# Patient Record
Sex: FEMALE | Race: BLACK OR AFRICAN AMERICAN | Employment: OTHER
[De-identification: names, ages, dates, MRNs, and addresses within clinical notes are randomized per-mention and may not be internally consistent; named-entity substitution may affect disease eponyms.]

---

## 2017-01-01 ENCOUNTER — TELEPHONE (OUTPATIENT)
Dept: CASE MANAGEMENT | Age: 72
End: 2017-01-01

## 2017-02-01 ENCOUNTER — TELEPHONE (OUTPATIENT)
Dept: CASE MANAGEMENT | Age: 72
End: 2017-02-01

## 2017-02-13 PROBLEM — K52.9 GASTROENTERITIS, ACUTE: Status: ACTIVE | Noted: 2017-02-13

## 2017-02-24 PROBLEM — I10 ESSENTIAL HYPERTENSION: Status: ACTIVE | Noted: 2017-02-24

## 2017-02-24 PROBLEM — E66.09 NON MORBID OBESITY DUE TO EXCESS CALORIES: Status: ACTIVE | Noted: 2017-02-24

## 2017-05-10 PROBLEM — J45.909 ASTHMA: Status: ACTIVE | Noted: 2017-01-01

## 2017-05-12 PROBLEM — J12.9 VIRAL PNEUMONIA: Status: ACTIVE | Noted: 2017-01-01

## 2017-09-07 PROBLEM — R10.32 LEFT LOWER QUADRANT PAIN: Status: ACTIVE | Noted: 2017-01-01

## 2017-09-07 PROBLEM — R06.00 DYSPNEA: Status: ACTIVE | Noted: 2017-01-01

## 2017-09-07 PROBLEM — I50.22 CHRONIC SYSTOLIC CHF (CONGESTIVE HEART FAILURE) (HCC): Status: ACTIVE | Noted: 2017-01-01

## 2017-09-07 PROBLEM — K62.5 RECTAL BLEEDING: Status: ACTIVE | Noted: 2017-01-01

## 2017-09-07 PROBLEM — K57.92 DIVERTICULITIS: Status: ACTIVE | Noted: 2017-01-01

## 2017-09-14 PROBLEM — K57.92 DIVERTICULITIS: Status: RESOLVED | Noted: 2017-01-01 | Resolved: 2017-01-01

## 2017-09-14 PROBLEM — K52.9 GASTROENTERITIS, ACUTE: Status: RESOLVED | Noted: 2017-02-13 | Resolved: 2017-01-01

## 2017-09-14 PROBLEM — R10.32 LEFT LOWER QUADRANT PAIN: Status: RESOLVED | Noted: 2017-01-01 | Resolved: 2017-01-01

## 2017-09-14 PROBLEM — R06.00 DYSPNEA: Status: RESOLVED | Noted: 2017-01-01 | Resolved: 2017-01-01

## 2017-09-14 PROBLEM — J12.9 VIRAL PNEUMONIA: Status: RESOLVED | Noted: 2017-01-01 | Resolved: 2017-01-01

## 2017-09-23 PROBLEM — R06.02 SHORTNESS OF BREATH: Status: ACTIVE | Noted: 2017-01-01

## 2017-09-23 PROBLEM — J20.9 ACUTE BRONCHITIS: Status: ACTIVE | Noted: 2017-01-01

## 2017-09-24 PROBLEM — J40 BRONCHITIS: Status: ACTIVE | Noted: 2017-01-01

## 2017-10-04 PROBLEM — C50.112 MALIGNANT NEOPLASM OF CENTRAL PORTION OF LEFT FEMALE BREAST (HCC): Status: ACTIVE | Noted: 2017-01-01

## 2017-11-13 PROBLEM — Z79.4 TYPE 2 DIABETES MELLITUS WITH DIABETIC NEPHROPATHY, WITH LONG-TERM CURRENT USE OF INSULIN (HCC): Status: ACTIVE | Noted: 2017-01-01

## 2017-11-13 PROBLEM — E11.21 TYPE 2 DIABETES MELLITUS WITH DIABETIC NEPHROPATHY, WITH LONG-TERM CURRENT USE OF INSULIN (HCC): Status: ACTIVE | Noted: 2017-01-01

## 2017-11-29 PROBLEM — R06.02 SHORTNESS OF BREATH: Status: RESOLVED | Noted: 2017-01-01 | Resolved: 2017-01-01

## 2017-12-01 PROBLEM — E78.5 HYPERLIPIDEMIA: Status: ACTIVE | Noted: 2017-01-01

## 2017-12-01 PROBLEM — I63.9 ACUTE ISCHEMIC STROKE (HCC): Status: ACTIVE | Noted: 2017-01-01

## 2017-12-01 PROBLEM — I25.10 CAD (CORONARY ARTERY DISEASE): Status: ACTIVE | Noted: 2017-01-01

## 2018-01-01 ENCOUNTER — APPOINTMENT (OUTPATIENT)
Dept: GENERAL RADIOLOGY | Age: 73
DRG: 637 | End: 2018-01-01
Payer: COMMERCIAL

## 2018-01-01 ENCOUNTER — APPOINTMENT (OUTPATIENT)
Dept: CT IMAGING | Age: 73
DRG: 637 | End: 2018-01-01
Payer: COMMERCIAL

## 2018-01-01 ENCOUNTER — HOSPITAL ENCOUNTER (INPATIENT)
Age: 73
LOS: 3 days | DRG: 637 | End: 2018-03-13
Attending: EMERGENCY MEDICINE | Admitting: FAMILY MEDICINE
Payer: COMMERCIAL

## 2018-01-01 ENCOUNTER — APPOINTMENT (OUTPATIENT)
Dept: INTERVENTIONAL RADIOLOGY/VASCULAR | Age: 73
DRG: 637 | End: 2018-01-01
Payer: COMMERCIAL

## 2018-01-01 VITALS
OXYGEN SATURATION: 80 % | HEIGHT: 60 IN | HEART RATE: 138 BPM | TEMPERATURE: 100 F | WEIGHT: 143.4 LBS | RESPIRATION RATE: 26 BRPM | BODY MASS INDEX: 28.16 KG/M2 | SYSTOLIC BLOOD PRESSURE: 113 MMHG | DIASTOLIC BLOOD PRESSURE: 78 MMHG

## 2018-01-01 DIAGNOSIS — E13.10 DIABETIC KETOACIDOSIS WITHOUT COMA ASSOCIATED WITH OTHER SPECIFIED DIABETES MELLITUS (HCC): Primary | ICD-10-CM

## 2018-01-01 DIAGNOSIS — N39.0 URINARY TRACT INFECTION WITHOUT HEMATURIA, SITE UNSPECIFIED: ICD-10-CM

## 2018-01-01 DIAGNOSIS — G93.41 METABOLIC ENCEPHALOPATHY: ICD-10-CM

## 2018-01-01 DIAGNOSIS — N17.9 AKI (ACUTE KIDNEY INJURY) (HCC): ICD-10-CM

## 2018-01-01 LAB
ACETAMINOPHEN LEVEL: <15 MCG/ML (ref 10–30)
ALBUMIN SERPL-MCNC: 3.6 G/DL (ref 3.5–5.2)
ALP BLD-CCNC: 123 U/L (ref 35–104)
ALT SERPL-CCNC: 176 U/L (ref 0–32)
AMMONIA: 14 UMOL/L (ref 11–51)
AMORPHOUS: ABNORMAL
AMPHETAMINE SCREEN, URINE: NOT DETECTED
ANION GAP SERPL CALCULATED.3IONS-SCNC: 18 MMOL/L (ref 7–16)
ANION GAP SERPL CALCULATED.3IONS-SCNC: 19 MMOL/L (ref 7–16)
ANION GAP SERPL CALCULATED.3IONS-SCNC: 20 MMOL/L (ref 7–16)
ANION GAP SERPL CALCULATED.3IONS-SCNC: 20 MMOL/L (ref 7–16)
ANION GAP SERPL CALCULATED.3IONS-SCNC: 21 MMOL/L (ref 7–16)
ANION GAP SERPL CALCULATED.3IONS-SCNC: 21 MMOL/L (ref 7–16)
ANION GAP SERPL CALCULATED.3IONS-SCNC: 22 MMOL/L (ref 7–16)
ANION GAP SERPL CALCULATED.3IONS-SCNC: 25 MMOL/L (ref 7–16)
ANION GAP SERPL CALCULATED.3IONS-SCNC: 27 MMOL/L (ref 7–16)
ANION GAP SERPL CALCULATED.3IONS-SCNC: 30 MMOL/L (ref 7–16)
APTT: 27.2 SEC (ref 24.5–35.1)
APTT: 28.2 SEC (ref 24.5–35.1)
APTT: 41.7 SEC (ref 24.5–35.1)
APTT: 47.8 SEC (ref 24.5–35.1)
APTT: 64.7 SEC (ref 24.5–35.1)
APTT: 67.9 SEC (ref 24.5–35.1)
AST SERPL-CCNC: 529 U/L (ref 0–31)
B.E.: -4.6 MMOL/L (ref -3–3)
BACTERIA: ABNORMAL /HPF
BARBITURATE SCREEN URINE: NOT DETECTED
BASOPHILS ABSOLUTE: 0.02 E9/L (ref 0–0.2)
BASOPHILS ABSOLUTE: 0.02 E9/L (ref 0–0.2)
BASOPHILS ABSOLUTE: 0.03 E9/L (ref 0–0.2)
BASOPHILS RELATIVE PERCENT: 0.1 % (ref 0–2)
BASOPHILS RELATIVE PERCENT: 0.1 % (ref 0–2)
BASOPHILS RELATIVE PERCENT: 0.2 % (ref 0–2)
BENZODIAZEPINE SCREEN, URINE: NOT DETECTED
BETA-HYDROXYBUTYRATE: 0.12 MMOL/L (ref 0.02–0.27)
BETA-HYDROXYBUTYRATE: 1.75 MMOL/L (ref 0.02–0.27)
BILIRUB SERPL-MCNC: 1 MG/DL (ref 0–1.2)
BILIRUB SERPL-MCNC: 1.7 MG/DL (ref 0–1.2)
BILIRUBIN DIRECT: 0.6 MG/DL (ref 0–0.3)
BILIRUBIN URINE: ABNORMAL
BILIRUBIN, INDIRECT: 0.4 MG/DL (ref 0–1)
BLOOD, URINE: ABNORMAL
BUN BLDV-MCNC: 28 MG/DL (ref 8–23)
BUN BLDV-MCNC: 30 MG/DL (ref 8–23)
BUN BLDV-MCNC: 33 MG/DL (ref 8–23)
BUN BLDV-MCNC: 33 MG/DL (ref 8–23)
BUN BLDV-MCNC: 34 MG/DL (ref 8–23)
BUN BLDV-MCNC: 35 MG/DL (ref 8–23)
BUN BLDV-MCNC: 35 MG/DL (ref 8–23)
BUN BLDV-MCNC: 36 MG/DL (ref 8–23)
BUN BLDV-MCNC: 37 MG/DL (ref 8–23)
BUN BLDV-MCNC: 38 MG/DL (ref 8–23)
BUN BLDV-MCNC: 39 MG/DL (ref 8–23)
BUN BLDV-MCNC: 43 MG/DL (ref 8–23)
CALCIUM SERPL-MCNC: 7.7 MG/DL (ref 8.6–10.2)
CALCIUM SERPL-MCNC: 7.7 MG/DL (ref 8.6–10.2)
CALCIUM SERPL-MCNC: 7.8 MG/DL (ref 8.6–10.2)
CALCIUM SERPL-MCNC: 7.9 MG/DL (ref 8.6–10.2)
CALCIUM SERPL-MCNC: 7.9 MG/DL (ref 8.6–10.2)
CALCIUM SERPL-MCNC: 8 MG/DL (ref 8.6–10.2)
CALCIUM SERPL-MCNC: 8 MG/DL (ref 8.6–10.2)
CALCIUM SERPL-MCNC: 8.1 MG/DL (ref 8.6–10.2)
CALCIUM SERPL-MCNC: 8.1 MG/DL (ref 8.6–10.2)
CALCIUM SERPL-MCNC: 8.2 MG/DL (ref 8.6–10.2)
CALCIUM SERPL-MCNC: 8.2 MG/DL (ref 8.6–10.2)
CALCIUM SERPL-MCNC: 9.5 MG/DL (ref 8.6–10.2)
CANNABINOID SCREEN URINE: NOT DETECTED
CHLORIDE BLD-SCNC: 100 MMOL/L (ref 98–107)
CHLORIDE BLD-SCNC: 100 MMOL/L (ref 98–107)
CHLORIDE BLD-SCNC: 101 MMOL/L (ref 98–107)
CHLORIDE BLD-SCNC: 84 MMOL/L (ref 98–107)
CHLORIDE BLD-SCNC: 93 MMOL/L (ref 98–107)
CHLORIDE BLD-SCNC: 94 MMOL/L (ref 98–107)
CHLORIDE BLD-SCNC: 94 MMOL/L (ref 98–107)
CHLORIDE BLD-SCNC: 95 MMOL/L (ref 98–107)
CHLORIDE BLD-SCNC: 97 MMOL/L (ref 98–107)
CHLORIDE BLD-SCNC: 98 MMOL/L (ref 98–107)
CHLORIDE BLD-SCNC: 99 MMOL/L (ref 98–107)
CHLORIDE URINE RANDOM: 24 MMOL/L
CHP ED QC CHECK: NORMAL
CHP ED QC CHECK: NORMAL
CK MB: 34 NG/ML (ref 0–4.3)
CK MB: 35.5 NG/ML (ref 0–4.3)
CLARITY: ABNORMAL
CO2: 11 MMOL/L (ref 22–29)
CO2: 13 MMOL/L (ref 22–29)
CO2: 14 MMOL/L (ref 22–29)
CO2: 14 MMOL/L (ref 22–29)
CO2: 16 MMOL/L (ref 22–29)
CO2: 17 MMOL/L (ref 22–29)
CO2: 17 MMOL/L (ref 22–29)
CO2: 18 MMOL/L (ref 22–29)
CO2: 20 MMOL/L (ref 22–29)
COCAINE METABOLITE SCREEN URINE: NOT DETECTED
COHB: 0.2 % (ref 0–1.5)
COHB: 0.3 % (ref 0–1.5)
COLOR: YELLOW
CREAT SERPL-MCNC: 1.5 MG/DL (ref 0.5–1)
CREAT SERPL-MCNC: 1.5 MG/DL (ref 0.5–1)
CREAT SERPL-MCNC: 1.6 MG/DL (ref 0.5–1)
CREAT SERPL-MCNC: 1.6 MG/DL (ref 0.5–1)
CREAT SERPL-MCNC: 1.7 MG/DL (ref 0.5–1)
CREAT SERPL-MCNC: 1.8 MG/DL (ref 0.5–1)
CREAT SERPL-MCNC: 2 MG/DL (ref 0.5–1)
CREAT SERPL-MCNC: 2 MG/DL (ref 0.5–1)
CREAT SERPL-MCNC: 2.2 MG/DL (ref 0.5–1)
CREAT SERPL-MCNC: 2.2 MG/DL (ref 0.5–1)
CREAT SERPL-MCNC: 2.5 MG/DL (ref 0.5–1)
CREATININE URINE: 344 MG/DL (ref 29–226)
CRITICAL: ABNORMAL
CRITICAL: ABNORMAL
DATE ANALYZED: ABNORMAL
DATE ANALYZED: ABNORMAL
DATE OF COLLECTION: ABNORMAL
DATE OF COLLECTION: ABNORMAL
EKG ATRIAL RATE: 109 BPM
EKG Q-T INTERVAL: 440 MS
EKG QRS DURATION: 160 MS
EKG QTC CALCULATION (BAZETT): 592 MS
EKG R AXIS: -137 DEGREES
EKG T AXIS: -65 DEGREES
EKG VENTRICULAR RATE: 109 BPM
EOSINOPHILS ABSOLUTE: 0 E9/L (ref 0.05–0.5)
EOSINOPHILS ABSOLUTE: 0 E9/L (ref 0.05–0.5)
EOSINOPHILS ABSOLUTE: 0.01 E9/L (ref 0.05–0.5)
EOSINOPHILS RELATIVE PERCENT: 0 % (ref 0–6)
EOSINOPHILS RELATIVE PERCENT: 0 % (ref 0–6)
EOSINOPHILS RELATIVE PERCENT: 0.1 % (ref 0–6)
ETHANOL: <10 MG/DL (ref 0–0.08)
FILM ARRAY ADENOVIRUS: NORMAL
FILM ARRAY BORDETELLA PERTUSSIS: NORMAL
FILM ARRAY CHLAMYDOPHILIA PNEUMONIAE: NORMAL
FILM ARRAY CORONAVIRUS 229E: NORMAL
FILM ARRAY CORONAVIRUS HKU1: NORMAL
FILM ARRAY CORONAVIRUS NL63: NORMAL
FILM ARRAY CORONAVIRUS OC43: NORMAL
FILM ARRAY INFLUENZA A VIRUS 09H1: NORMAL
FILM ARRAY INFLUENZA A VIRUS H1: NORMAL
FILM ARRAY INFLUENZA A VIRUS H3: NORMAL
FILM ARRAY INFLUENZA A VIRUS: NORMAL
FILM ARRAY INFLUENZA B: NORMAL
FILM ARRAY METAPNEUMOVIRUS: NORMAL
FILM ARRAY MYCOPLASMA PNEUMONIAE: NORMAL
FILM ARRAY PARAINFLUENZA VIRUS 1: NORMAL
FILM ARRAY PARAINFLUENZA VIRUS 2: NORMAL
FILM ARRAY PARAINFLUENZA VIRUS 3: NORMAL
FILM ARRAY PARAINFLUENZA VIRUS 4: NORMAL
FILM ARRAY RESPIRATORY SYNCITIAL VIRUS: NORMAL
FILM ARRAY RHINOVIRUS/ENTEROVIRUS: NORMAL
GFR AFRICAN AMERICAN: 18
GFR AFRICAN AMERICAN: 23
GFR AFRICAN AMERICAN: 26
GFR AFRICAN AMERICAN: 26
GFR AFRICAN AMERICAN: 29
GFR AFRICAN AMERICAN: 29
GFR AFRICAN AMERICAN: 33
GFR AFRICAN AMERICAN: 35
GFR AFRICAN AMERICAN: 38
GFR AFRICAN AMERICAN: 38
GFR AFRICAN AMERICAN: 41
GFR AFRICAN AMERICAN: 41
GFR NON-AFRICAN AMERICAN: 15 ML/MIN/1.73
GFR NON-AFRICAN AMERICAN: 23 ML/MIN/1.73
GFR NON-AFRICAN AMERICAN: 26 ML/MIN/1.73
GFR NON-AFRICAN AMERICAN: 26 ML/MIN/1.73
GFR NON-AFRICAN AMERICAN: 29 ML/MIN/1.73
GFR NON-AFRICAN AMERICAN: 29 ML/MIN/1.73
GFR NON-AFRICAN AMERICAN: 33 ML/MIN/1.73
GFR NON-AFRICAN AMERICAN: 35 ML/MIN/1.73
GFR NON-AFRICAN AMERICAN: 38 ML/MIN/1.73
GFR NON-AFRICAN AMERICAN: 38 ML/MIN/1.73
GFR NON-AFRICAN AMERICAN: 41 ML/MIN/1.73
GFR NON-AFRICAN AMERICAN: 41 ML/MIN/1.73
GLUCOSE BLD-MCNC: 131 MG/DL (ref 74–109)
GLUCOSE BLD-MCNC: 134 MG/DL (ref 74–109)
GLUCOSE BLD-MCNC: 136 MG/DL (ref 74–109)
GLUCOSE BLD-MCNC: 164 MG/DL (ref 74–109)
GLUCOSE BLD-MCNC: 171 MG/DL (ref 74–109)
GLUCOSE BLD-MCNC: 174 MG/DL (ref 74–109)
GLUCOSE BLD-MCNC: 176 MG/DL (ref 74–109)
GLUCOSE BLD-MCNC: 183 MG/DL (ref 74–109)
GLUCOSE BLD-MCNC: 195 MG/DL (ref 74–109)
GLUCOSE BLD-MCNC: 200 MG/DL (ref 74–109)
GLUCOSE BLD-MCNC: 201 MG/DL (ref 74–109)
GLUCOSE BLD-MCNC: 203 MG/DL (ref 74–109)
GLUCOSE BLD-MCNC: 217 MG/DL (ref 74–109)
GLUCOSE BLD-MCNC: 253 MG/DL (ref 74–109)
GLUCOSE BLD-MCNC: 299 MG/DL
GLUCOSE BLD-MCNC: 398 MG/DL
GLUCOSE BLD-MCNC: 449 MG/DL (ref 74–109)
GLUCOSE BLD-MCNC: 472 MG/DL (ref 74–109)
GLUCOSE URINE: 250 MG/DL
HBA1C MFR BLD: 8.8 % (ref 4.8–5.9)
HCO3: 18.7 MMOL/L (ref 22–26)
HCT VFR BLD CALC: 30.4 % (ref 34–48)
HCT VFR BLD CALC: 31.9 % (ref 34–48)
HCT VFR BLD CALC: 32.7 % (ref 34–48)
HCT VFR BLD CALC: 38.1 % (ref 34–48)
HEMOGLOBIN: 10.5 G/DL (ref 11.5–15.5)
HEMOGLOBIN: 10.5 G/DL (ref 11.5–15.5)
HEMOGLOBIN: 12.5 G/DL (ref 11.5–15.5)
HEMOGLOBIN: 9.8 G/DL (ref 11.5–15.5)
HHB: 0.6 % (ref 0–5)
HHB: 1.8 % (ref 0–5)
IMMATURE GRANULOCYTES #: 0.09 E9/L
IMMATURE GRANULOCYTES #: 0.11 E9/L
IMMATURE GRANULOCYTES #: 0.18 E9/L
IMMATURE GRANULOCYTES %: 0.6 % (ref 0–5)
IMMATURE GRANULOCYTES %: 0.6 % (ref 0–5)
IMMATURE GRANULOCYTES %: 0.9 % (ref 0–5)
INR BLD: 1.7
INR BLD: 1.9
KETONES, URINE: ABNORMAL MG/DL
LAB: 9558
LAB: ABNORMAL
LACTIC ACID: 3.2 MMOL/L (ref 0.5–2.2)
LACTIC ACID: 3.5 MMOL/L (ref 0.5–2.2)
LACTIC ACID: 4 MMOL/L (ref 0.5–2.2)
LACTIC ACID: 4.4 MMOL/L (ref 0.5–2.2)
LACTIC ACID: 4.6 MMOL/L (ref 0.5–2.2)
LACTIC ACID: 5.9 MMOL/L (ref 0.5–2.2)
LACTIC ACID: 5.9 MMOL/L (ref 0.5–2.2)
LACTIC ACID: 6.9 MMOL/L (ref 0.5–2.2)
LACTIC ACID: 7.5 MMOL/L (ref 0.5–2.2)
LACTIC ACID: 7.9 MMOL/L (ref 0.5–2.2)
LACTIC ACID: 9.8 MMOL/L (ref 0.5–2.2)
LEUKOCYTE ESTERASE, URINE: ABNORMAL
LV EF: 13 %
LVEF MODALITY: NORMAL
LYMPHOCYTES ABSOLUTE: 1.41 E9/L (ref 1.5–4)
LYMPHOCYTES ABSOLUTE: 1.56 E9/L (ref 1.5–4)
LYMPHOCYTES ABSOLUTE: 1.88 E9/L (ref 1.5–4)
LYMPHOCYTES RELATIVE PERCENT: 10 % (ref 20–42)
LYMPHOCYTES RELATIVE PERCENT: 8.2 % (ref 20–42)
LYMPHOCYTES RELATIVE PERCENT: 9.1 % (ref 20–42)
Lab: 1208
Lab: 40
MAGNESIUM: 1.3 MG/DL (ref 1.6–2.6)
MAGNESIUM: 1.6 MG/DL (ref 1.6–2.6)
MAGNESIUM: 1.8 MG/DL (ref 1.6–2.6)
MAGNESIUM: 2.1 MG/DL (ref 1.6–2.6)
MAGNESIUM: 2.1 MG/DL (ref 1.6–2.6)
MAGNESIUM: 2.2 MG/DL (ref 1.6–2.6)
MAGNESIUM: 2.3 MG/DL (ref 1.6–2.6)
MAGNESIUM: 2.3 MG/DL (ref 1.6–2.6)
MAGNESIUM: 2.4 MG/DL (ref 1.6–2.6)
MAGNESIUM: 2.5 MG/DL (ref 1.6–2.6)
MAGNESIUM: 2.5 MG/DL (ref 1.6–2.6)
MAGNESIUM: 2.7 MG/DL (ref 1.6–2.6)
MAGNESIUM: 3 MG/DL (ref 1.6–2.6)
MCH RBC QN AUTO: 30.2 PG (ref 26–35)
MCH RBC QN AUTO: 30.3 PG (ref 26–35)
MCH RBC QN AUTO: 30.3 PG (ref 26–35)
MCH RBC QN AUTO: 30.7 PG (ref 26–35)
MCHC RBC AUTO-ENTMCNC: 32.1 % (ref 32–34.5)
MCHC RBC AUTO-ENTMCNC: 32.2 % (ref 32–34.5)
MCHC RBC AUTO-ENTMCNC: 32.8 % (ref 32–34.5)
MCHC RBC AUTO-ENTMCNC: 32.9 % (ref 32–34.5)
MCV RBC AUTO: 92 FL (ref 80–99.9)
MCV RBC AUTO: 93.3 FL (ref 80–99.9)
MCV RBC AUTO: 94.1 FL (ref 80–99.9)
MCV RBC AUTO: 94.2 FL (ref 80–99.9)
METER GLUCOSE: 107 MG/DL (ref 70–110)
METER GLUCOSE: 114 MG/DL (ref 70–110)
METER GLUCOSE: 116 MG/DL (ref 70–110)
METER GLUCOSE: 123 MG/DL (ref 70–110)
METER GLUCOSE: 126 MG/DL (ref 70–110)
METER GLUCOSE: 126 MG/DL (ref 70–110)
METER GLUCOSE: 128 MG/DL (ref 70–110)
METER GLUCOSE: 129 MG/DL (ref 70–110)
METER GLUCOSE: 130 MG/DL (ref 70–110)
METER GLUCOSE: 132 MG/DL (ref 70–110)
METER GLUCOSE: 133 MG/DL (ref 70–110)
METER GLUCOSE: 138 MG/DL (ref 70–110)
METER GLUCOSE: 140 MG/DL (ref 70–110)
METER GLUCOSE: 141 MG/DL (ref 70–110)
METER GLUCOSE: 143 MG/DL (ref 70–110)
METER GLUCOSE: 145 MG/DL (ref 70–110)
METER GLUCOSE: 146 MG/DL (ref 70–110)
METER GLUCOSE: 147 MG/DL (ref 70–110)
METER GLUCOSE: 151 MG/DL (ref 70–110)
METER GLUCOSE: 154 MG/DL (ref 70–110)
METER GLUCOSE: 156 MG/DL (ref 70–110)
METER GLUCOSE: 156 MG/DL (ref 70–110)
METER GLUCOSE: 157 MG/DL (ref 70–110)
METER GLUCOSE: 158 MG/DL (ref 70–110)
METER GLUCOSE: 158 MG/DL (ref 70–110)
METER GLUCOSE: 166 MG/DL (ref 70–110)
METER GLUCOSE: 167 MG/DL (ref 70–110)
METER GLUCOSE: 169 MG/DL (ref 70–110)
METER GLUCOSE: 171 MG/DL (ref 70–110)
METER GLUCOSE: 174 MG/DL (ref 70–110)
METER GLUCOSE: 176 MG/DL (ref 70–110)
METER GLUCOSE: 179 MG/DL (ref 70–110)
METER GLUCOSE: 183 MG/DL (ref 70–110)
METER GLUCOSE: 185 MG/DL (ref 70–110)
METER GLUCOSE: 190 MG/DL (ref 70–110)
METER GLUCOSE: 190 MG/DL (ref 70–110)
METER GLUCOSE: 191 MG/DL (ref 70–110)
METER GLUCOSE: 193 MG/DL (ref 70–110)
METER GLUCOSE: 193 MG/DL (ref 70–110)
METER GLUCOSE: 195 MG/DL (ref 70–110)
METER GLUCOSE: 195 MG/DL (ref 70–110)
METER GLUCOSE: 202 MG/DL (ref 70–110)
METER GLUCOSE: 203 MG/DL (ref 70–110)
METER GLUCOSE: 207 MG/DL (ref 70–110)
METER GLUCOSE: 214 MG/DL (ref 70–110)
METER GLUCOSE: 242 MG/DL (ref 70–110)
METER GLUCOSE: 299 MG/DL (ref 70–110)
METER GLUCOSE: 322 MG/DL (ref 70–110)
METER GLUCOSE: 398 MG/DL (ref 70–110)
METER GLUCOSE: 426 MG/DL (ref 70–110)
METER GLUCOSE: 88 MG/DL (ref 70–110)
METER GLUCOSE: >500 MG/DL (ref 70–110)
METHADONE SCREEN, URINE: NOT DETECTED
METHB: 0.4 % (ref 0–1.5)
METHB: 1 % (ref 0–1.5)
MODE: ABNORMAL
MODE: ABNORMAL
MONOCYTES ABSOLUTE: 0.83 E9/L (ref 0.1–0.95)
MONOCYTES ABSOLUTE: 1.29 E9/L (ref 0.1–0.95)
MONOCYTES ABSOLUTE: 1.39 E9/L (ref 0.1–0.95)
MONOCYTES RELATIVE PERCENT: 5.3 % (ref 2–12)
MONOCYTES RELATIVE PERCENT: 6.9 % (ref 2–12)
MONOCYTES RELATIVE PERCENT: 7.3 % (ref 2–12)
MRSA CULTURE ONLY: NORMAL
NEUTROPHILS ABSOLUTE: 13.2 E9/L (ref 1.8–7.3)
NEUTROPHILS ABSOLUTE: 15.39 E9/L (ref 1.8–7.3)
NEUTROPHILS ABSOLUTE: 15.94 E9/L (ref 1.8–7.3)
NEUTROPHILS RELATIVE PERCENT: 82.2 % (ref 43–80)
NEUTROPHILS RELATIVE PERCENT: 83.5 % (ref 43–80)
NEUTROPHILS RELATIVE PERCENT: 84.9 % (ref 43–80)
NITRITE, URINE: NEGATIVE
O2 CONTENT: 16 ML/DL
O2 CONTENT: 18.8 ML/DL
O2 SATURATION: 98.2 % (ref 92–98.5)
O2 SATURATION: 99.4 % (ref 92–98.5)
O2HB: 97.5 % (ref 94–97)
O2HB: 98.2 % (ref 94–97)
OPERATOR ID: 1353
OPERATOR ID: ABNORMAL
OPIATE SCREEN URINE: NOT DETECTED
PATIENT TEMP: 37 C
PATIENT TEMP: 37 C
PCO2: 29.5 MMHG (ref 35–45)
PCO2: <15 MMHG (ref 35–45)
PDW BLD-RTO: 15.2 FL (ref 11.5–15)
PDW BLD-RTO: 15.3 FL (ref 11.5–15)
PDW BLD-RTO: 15.3 FL (ref 11.5–15)
PDW BLD-RTO: 15.4 FL (ref 11.5–15)
PH BLOOD GAS: 7.35 (ref 7.35–7.45)
PH BLOOD GAS: 7.42 (ref 7.35–7.45)
PH UA: 5 (ref 5–9)
PH VENOUS: 7.27 (ref 7.3–7.42)
PHENCYCLIDINE SCREEN URINE: NOT DETECTED
PHOSPHORUS: 2.7 MG/DL (ref 2.5–4.5)
PHOSPHORUS: 2.9 MG/DL (ref 2.5–4.5)
PHOSPHORUS: 3 MG/DL (ref 2.5–4.5)
PHOSPHORUS: 3 MG/DL (ref 2.5–4.5)
PHOSPHORUS: 3.2 MG/DL (ref 2.5–4.5)
PHOSPHORUS: 3.4 MG/DL (ref 2.5–4.5)
PHOSPHORUS: 3.4 MG/DL (ref 2.5–4.5)
PHOSPHORUS: 3.5 MG/DL (ref 2.5–4.5)
PHOSPHORUS: 3.6 MG/DL (ref 2.5–4.5)
PHOSPHORUS: 3.7 MG/DL (ref 2.5–4.5)
PHOSPHORUS: 3.8 MG/DL (ref 2.5–4.5)
PHOSPHORUS: 3.8 MG/DL (ref 2.5–4.5)
PHOSPHORUS: 3.9 MG/DL (ref 2.5–4.5)
PHOSPHORUS: 4.1 MG/DL (ref 2.5–4.5)
PHOSPHORUS: 4.4 MG/DL (ref 2.5–4.5)
PLATELET # BLD: 124 E9/L (ref 130–450)
PLATELET # BLD: 125 E9/L (ref 130–450)
PLATELET # BLD: 195 E9/L (ref 130–450)
PLATELET # BLD: 69 E9/L (ref 130–450)
PLATELET CONFIRMATION: NORMAL
PMV BLD AUTO: 11.6 FL (ref 7–12)
PMV BLD AUTO: 11.8 FL (ref 7–12)
PMV BLD AUTO: 12.1 FL (ref 7–12)
PMV BLD AUTO: 12.4 FL (ref 7–12)
PO2: 125.9 MMHG (ref 60–100)
PO2: 409.8 MMHG (ref 60–100)
POC CHLORIDE: 98 MMOL/L (ref 100–108)
POC CREATININE: 3 MG/DL (ref 0.5–1)
POC POTASSIUM: 5.2 MMOL/L (ref 3.5–5)
POC SODIUM: 129 MMOL/L (ref 132–146)
POTASSIUM SERPL-SCNC: 3.8 MMOL/L (ref 3.5–5)
POTASSIUM SERPL-SCNC: 4 MMOL/L (ref 3.5–5)
POTASSIUM SERPL-SCNC: 4.1 MMOL/L (ref 3.5–5)
POTASSIUM SERPL-SCNC: 4.4 MMOL/L (ref 3.5–5)
POTASSIUM SERPL-SCNC: 4.5 MMOL/L (ref 3.5–5)
POTASSIUM SERPL-SCNC: 4.54 MMOL/L (ref 3.3–5.1)
POTASSIUM SERPL-SCNC: 4.6 MMOL/L (ref 3.5–5)
POTASSIUM SERPL-SCNC: 4.7 MMOL/L (ref 3.5–5)
POTASSIUM SERPL-SCNC: 4.9 MMOL/L (ref 3.5–5)
POTASSIUM SERPL-SCNC: 4.9 MMOL/L (ref 3.5–5)
POTASSIUM SERPL-SCNC: 5 MMOL/L (ref 3.5–5)
POTASSIUM SERPL-SCNC: 5.3 MMOL/L (ref 3.5–5)
POTASSIUM SERPL-SCNC: 5.4 MMOL/L (ref 3.5–5)
POTASSIUM SERPL-SCNC: 5.9 MMOL/L (ref 3.5–5)
POTASSIUM, UR: 50.5 MMOL/L
PROPOXYPHENE SCREEN: NOT DETECTED
PROTEIN UA: ABNORMAL MG/DL
PROTHROMBIN TIME: 19 SEC (ref 9.3–12.4)
PROTHROMBIN TIME: 21 SEC (ref 9.3–12.4)
RBC # BLD: 3.23 E12/L (ref 3.5–5.5)
RBC # BLD: 3.42 E12/L (ref 3.5–5.5)
RBC # BLD: 3.47 E12/L (ref 3.5–5.5)
RBC # BLD: 4.14 E12/L (ref 3.5–5.5)
RBC UA: ABNORMAL /HPF (ref 0–2)
SALICYLATE, SERUM: <0.3 MG/DL (ref 0–30)
SODIUM BLD-SCNC: 130 MMOL/L (ref 132–146)
SODIUM BLD-SCNC: 131 MMOL/L (ref 132–146)
SODIUM BLD-SCNC: 131 MMOL/L (ref 132–146)
SODIUM BLD-SCNC: 132 MMOL/L (ref 132–146)
SODIUM BLD-SCNC: 133 MMOL/L (ref 132–146)
SODIUM BLD-SCNC: 135 MMOL/L (ref 132–146)
SODIUM BLD-SCNC: 137 MMOL/L (ref 132–146)
SODIUM BLD-SCNC: 137 MMOL/L (ref 132–146)
SODIUM BLD-SCNC: 138 MMOL/L (ref 132–146)
SODIUM BLD-SCNC: 139 MMOL/L (ref 132–146)
SODIUM URINE: 29 MMOL/L
SOURCE, BLOOD GAS: ABNORMAL
SOURCE, BLOOD GAS: ABNORMAL
SPECIFIC GRAVITY UA: >=1.03 (ref 1–1.03)
THB: 11.5 G/DL (ref 11.5–16.5)
THB: 12.8 G/DL (ref 11.5–16.5)
TIME ANALYZED: 1211
TIME ANALYZED: 41
TOTAL CK: 3183 U/L (ref 20–180)
TOTAL CK: 3259 U/L (ref 20–180)
TOTAL CK: 3785 U/L (ref 20–180)
TOTAL PROTEIN: 7.8 G/DL (ref 6.4–8.3)
TRICYCLIC ANTIDEPRESSANTS SCREEN SERUM: NEGATIVE NG/ML
TROPONIN: 6.99 NG/ML (ref 0–0.03)
TROPONIN: 7.42 NG/ML (ref 0–0.03)
TROPONIN: 7.48 NG/ML (ref 0–0.03)
TROPONIN: 7.49 NG/ML (ref 0–0.03)
UREA NITROGEN, UR: 318 MG/DL (ref 800–1666)
URINE CULTURE, ROUTINE: NORMAL
URINE CULTURE, ROUTINE: NORMAL
UROBILINOGEN, URINE: 1 E.U./DL
WBC # BLD: 15.6 E9/L (ref 4.5–11.5)
WBC # BLD: 16.9 E9/L (ref 4.5–11.5)
WBC # BLD: 18.7 E9/L (ref 4.5–11.5)
WBC # BLD: 19.1 E9/L (ref 4.5–11.5)
WBC UA: ABNORMAL /HPF (ref 0–5)

## 2018-01-01 PROCEDURE — 6360000002 HC RX W HCPCS: Performed by: HOSPITALIST

## 2018-01-01 PROCEDURE — 87798 DETECT AGENT NOS DNA AMP: CPT

## 2018-01-01 PROCEDURE — 85027 COMPLETE CBC AUTOMATED: CPT

## 2018-01-01 PROCEDURE — 80048 BASIC METABOLIC PNL TOTAL CA: CPT

## 2018-01-01 PROCEDURE — 99232 SBSQ HOSP IP/OBS MODERATE 35: CPT | Performed by: STUDENT IN AN ORGANIZED HEALTH CARE EDUCATION/TRAINING PROGRAM

## 2018-01-01 PROCEDURE — 2580000003 HC RX 258: Performed by: INTERNAL MEDICINE

## 2018-01-01 PROCEDURE — 99223 1ST HOSP IP/OBS HIGH 75: CPT | Performed by: INTERNAL MEDICINE

## 2018-01-01 PROCEDURE — 2580000003 HC RX 258: Performed by: HOSPITALIST

## 2018-01-01 PROCEDURE — 93307 TTE W/O DOPPLER COMPLETE: CPT

## 2018-01-01 PROCEDURE — 80053 COMPREHEN METABOLIC PANEL: CPT

## 2018-01-01 PROCEDURE — 2500000003 HC RX 250 WO HCPCS: Performed by: FAMILY MEDICINE

## 2018-01-01 PROCEDURE — 2700000000 HC OXYGEN THERAPY PER DAY

## 2018-01-01 PROCEDURE — 87040 BLOOD CULTURE FOR BACTERIA: CPT

## 2018-01-01 PROCEDURE — 6370000000 HC RX 637 (ALT 250 FOR IP): Performed by: HOSPITALIST

## 2018-01-01 PROCEDURE — 93923 UPR/LXTR ART STDY 3+ LVLS: CPT

## 2018-01-01 PROCEDURE — 2500000003 HC RX 250 WO HCPCS

## 2018-01-01 PROCEDURE — 36415 COLL VENOUS BLD VENIPUNCTURE: CPT

## 2018-01-01 PROCEDURE — 51702 INSERT TEMP BLADDER CATH: CPT

## 2018-01-01 PROCEDURE — 87081 CULTURE SCREEN ONLY: CPT

## 2018-01-01 PROCEDURE — 80307 DRUG TEST PRSMV CHEM ANLYZR: CPT

## 2018-01-01 PROCEDURE — C9113 INJ PANTOPRAZOLE SODIUM, VIA: HCPCS | Performed by: FAMILY MEDICINE

## 2018-01-01 PROCEDURE — 83605 ASSAY OF LACTIC ACID: CPT

## 2018-01-01 PROCEDURE — 84100 ASSAY OF PHOSPHORUS: CPT

## 2018-01-01 PROCEDURE — 82805 BLOOD GASES W/O2 SATURATION: CPT

## 2018-01-01 PROCEDURE — 81003 URINALYSIS AUTO W/O SCOPE: CPT

## 2018-01-01 PROCEDURE — 2580000003 HC RX 258: Performed by: FAMILY MEDICINE

## 2018-01-01 PROCEDURE — 82962 GLUCOSE BLOOD TEST: CPT

## 2018-01-01 PROCEDURE — 87077 CULTURE AEROBIC IDENTIFY: CPT

## 2018-01-01 PROCEDURE — 94664 DEMO&/EVAL PT USE INHALER: CPT

## 2018-01-01 PROCEDURE — 82570 ASSAY OF URINE CREATININE: CPT

## 2018-01-01 PROCEDURE — 2000000000 HC ICU R&B

## 2018-01-01 PROCEDURE — 36556 INSERT NON-TUNNEL CV CATH: CPT

## 2018-01-01 PROCEDURE — 82947 ASSAY GLUCOSE BLOOD QUANT: CPT

## 2018-01-01 PROCEDURE — 84540 ASSAY OF URINE/UREA-N: CPT

## 2018-01-01 PROCEDURE — 71045 X-RAY EXAM CHEST 1 VIEW: CPT

## 2018-01-01 PROCEDURE — 06HM33Z INSERTION OF INFUSION DEVICE INTO RIGHT FEMORAL VEIN, PERCUTANEOUS APPROACH: ICD-10-PCS | Performed by: EMERGENCY MEDICINE

## 2018-01-01 PROCEDURE — 6360000002 HC RX W HCPCS: Performed by: FAMILY MEDICINE

## 2018-01-01 PROCEDURE — 6370000000 HC RX 637 (ALT 250 FOR IP): Performed by: EMERGENCY MEDICINE

## 2018-01-01 PROCEDURE — 6370000000 HC RX 637 (ALT 250 FOR IP): Performed by: FAMILY MEDICINE

## 2018-01-01 PROCEDURE — 94640 AIRWAY INHALATION TREATMENT: CPT

## 2018-01-01 PROCEDURE — 83735 ASSAY OF MAGNESIUM: CPT

## 2018-01-01 PROCEDURE — 87149 DNA/RNA DIRECT PROBE: CPT

## 2018-01-01 PROCEDURE — 93005 ELECTROCARDIOGRAM TRACING: CPT | Performed by: EMERGENCY MEDICINE

## 2018-01-01 PROCEDURE — 2580000003 HC RX 258: Performed by: EMERGENCY MEDICINE

## 2018-01-01 PROCEDURE — 99221 1ST HOSP IP/OBS SF/LOW 40: CPT | Performed by: SURGERY

## 2018-01-01 PROCEDURE — 93005 ELECTROCARDIOGRAM TRACING: CPT | Performed by: INTERNAL MEDICINE

## 2018-01-01 PROCEDURE — 99223 1ST HOSP IP/OBS HIGH 75: CPT | Performed by: FAMILY MEDICINE

## 2018-01-01 PROCEDURE — 85025 COMPLETE CBC W/AUTO DIFF WBC: CPT

## 2018-01-01 PROCEDURE — 6360000002 HC RX W HCPCS: Performed by: INTERNAL MEDICINE

## 2018-01-01 PROCEDURE — 81015 MICROSCOPIC EXAM OF URINE: CPT

## 2018-01-01 PROCEDURE — 85730 THROMBOPLASTIN TIME PARTIAL: CPT

## 2018-01-01 PROCEDURE — 84132 ASSAY OF SERUM POTASSIUM: CPT

## 2018-01-01 PROCEDURE — 87501 INFLUENZA DNA AMP PROB 1+: CPT

## 2018-01-01 PROCEDURE — 85610 PROTHROMBIN TIME: CPT

## 2018-01-01 PROCEDURE — 83036 HEMOGLOBIN GLYCOSYLATED A1C: CPT

## 2018-01-01 PROCEDURE — 82140 ASSAY OF AMMONIA: CPT

## 2018-01-01 PROCEDURE — 82010 KETONE BODYS QUAN: CPT

## 2018-01-01 PROCEDURE — 84133 ASSAY OF URINE POTASSIUM: CPT

## 2018-01-01 PROCEDURE — 82800 BLOOD PH: CPT

## 2018-01-01 PROCEDURE — 82553 CREATINE MB FRACTION: CPT

## 2018-01-01 PROCEDURE — 87486 CHLMYD PNEUM DNA AMP PROBE: CPT

## 2018-01-01 PROCEDURE — 2500000003 HC RX 250 WO HCPCS: Performed by: INTERNAL MEDICINE

## 2018-01-01 PROCEDURE — 82247 BILIRUBIN TOTAL: CPT

## 2018-01-01 PROCEDURE — 6370000000 HC RX 637 (ALT 250 FOR IP): Performed by: INTERNAL MEDICINE

## 2018-01-01 PROCEDURE — 87088 URINE BACTERIA CULTURE: CPT

## 2018-01-01 PROCEDURE — 87581 M.PNEUMON DNA AMP PROBE: CPT

## 2018-01-01 PROCEDURE — 82248 BILIRUBIN DIRECT: CPT

## 2018-01-01 PROCEDURE — 70450 CT HEAD/BRAIN W/O DYE: CPT

## 2018-01-01 PROCEDURE — 84300 ASSAY OF URINE SODIUM: CPT

## 2018-01-01 PROCEDURE — 84484 ASSAY OF TROPONIN QUANT: CPT

## 2018-01-01 PROCEDURE — 36592 COLLECT BLOOD FROM PICC: CPT

## 2018-01-01 PROCEDURE — 99291 CRITICAL CARE FIRST HOUR: CPT

## 2018-01-01 PROCEDURE — 87186 SC STD MICRODIL/AGAR DIL: CPT

## 2018-01-01 PROCEDURE — 82436 ASSAY OF URINE CHLORIDE: CPT

## 2018-01-01 PROCEDURE — 82550 ASSAY OF CK (CPK): CPT

## 2018-01-01 PROCEDURE — 6360000002 HC RX W HCPCS: Performed by: EMERGENCY MEDICINE

## 2018-01-01 PROCEDURE — 36600 WITHDRAWAL OF ARTERIAL BLOOD: CPT

## 2018-01-01 PROCEDURE — 82435 ASSAY OF BLOOD CHLORIDE: CPT

## 2018-01-01 PROCEDURE — 87503 INFLUENZA DNA AMP PROB ADDL: CPT

## 2018-01-01 PROCEDURE — 94660 CPAP INITIATION&MGMT: CPT

## 2018-01-01 PROCEDURE — 6360000002 HC RX W HCPCS

## 2018-01-01 PROCEDURE — APPSS60 APP SPLIT SHARED TIME 46-60 MINUTES: Performed by: CLINICAL NURSE SPECIALIST

## 2018-01-01 PROCEDURE — 99222 1ST HOSP IP/OBS MODERATE 55: CPT | Performed by: CLINICAL NURSE SPECIALIST

## 2018-01-01 PROCEDURE — 93278 ECG/SIGNAL-AVERAGED: CPT

## 2018-01-01 PROCEDURE — 84295 ASSAY OF SERUM SODIUM: CPT

## 2018-01-01 PROCEDURE — 99233 SBSQ HOSP IP/OBS HIGH 50: CPT | Performed by: INTERNAL MEDICINE

## 2018-01-01 PROCEDURE — 82565 ASSAY OF CREATININE: CPT

## 2018-01-01 PROCEDURE — 93005 ELECTROCARDIOGRAM TRACING: CPT

## 2018-01-01 RX ORDER — IPRATROPIUM BROMIDE AND ALBUTEROL SULFATE 2.5; .5 MG/3ML; MG/3ML
1 SOLUTION RESPIRATORY (INHALATION)
Status: DISCONTINUED | OUTPATIENT
Start: 2018-01-01 | End: 2018-01-01 | Stop reason: HOSPADM

## 2018-01-01 RX ORDER — SODIUM CHLORIDE 0.9 % (FLUSH) 0.9 %
10 SYRINGE (ML) INJECTION EVERY 12 HOURS SCHEDULED
Status: DISCONTINUED | OUTPATIENT
Start: 2018-01-01 | End: 2018-01-01

## 2018-01-01 RX ORDER — MAGNESIUM SULFATE IN WATER 40 MG/ML
2 INJECTION, SOLUTION INTRAVENOUS PRN
Status: DISCONTINUED | OUTPATIENT
Start: 2018-01-01 | End: 2018-01-01 | Stop reason: HOSPADM

## 2018-01-01 RX ORDER — BUDESONIDE 0.5 MG/2ML
500 INHALANT ORAL 2 TIMES DAILY
Status: DISCONTINUED | OUTPATIENT
Start: 2018-01-01 | End: 2018-01-01

## 2018-01-01 RX ORDER — ROCURONIUM BROMIDE 10 MG/ML
INJECTION, SOLUTION INTRAVENOUS
Status: DISCONTINUED
Start: 2018-01-01 | End: 2018-01-01 | Stop reason: WASHOUT

## 2018-01-01 RX ORDER — DEXTROSE, SODIUM CHLORIDE, AND POTASSIUM CHLORIDE 5; .45; .15 G/100ML; G/100ML; G/100ML
INJECTION INTRAVENOUS CONTINUOUS PRN
Status: DISCONTINUED | OUTPATIENT
Start: 2018-01-01 | End: 2018-01-01

## 2018-01-01 RX ORDER — HEPARIN SODIUM 1000 [USP'U]/ML
30 INJECTION, SOLUTION INTRAVENOUS; SUBCUTANEOUS PRN
Status: DISCONTINUED | OUTPATIENT
Start: 2018-01-01 | End: 2018-01-01 | Stop reason: HOSPADM

## 2018-01-01 RX ORDER — POTASSIUM CHLORIDE 7.45 MG/ML
10 INJECTION INTRAVENOUS PRN
Status: DISCONTINUED | OUTPATIENT
Start: 2018-01-01 | End: 2018-01-01

## 2018-01-01 RX ORDER — ONDANSETRON 2 MG/ML
4 INJECTION INTRAMUSCULAR; INTRAVENOUS EVERY 6 HOURS PRN
Status: DISCONTINUED | OUTPATIENT
Start: 2018-01-01 | End: 2018-01-01 | Stop reason: HOSPADM

## 2018-01-01 RX ORDER — DEXTROSE MONOHYDRATE 25 G/50ML
12.5 INJECTION, SOLUTION INTRAVENOUS PRN
Status: DISCONTINUED | OUTPATIENT
Start: 2018-01-01 | End: 2018-01-01 | Stop reason: HOSPADM

## 2018-01-01 RX ORDER — IPRATROPIUM BROMIDE AND ALBUTEROL SULFATE 2.5; .5 MG/3ML; MG/3ML
1 SOLUTION RESPIRATORY (INHALATION) EVERY 4 HOURS
Status: DISCONTINUED | OUTPATIENT
Start: 2018-01-01 | End: 2018-01-01 | Stop reason: HOSPADM

## 2018-01-01 RX ORDER — PANTOPRAZOLE SODIUM 40 MG/10ML
40 INJECTION, POWDER, LYOPHILIZED, FOR SOLUTION INTRAVENOUS DAILY
Status: DISCONTINUED | OUTPATIENT
Start: 2018-01-01 | End: 2018-01-01

## 2018-01-01 RX ORDER — MAGNESIUM SULFATE 1 G/100ML
1 INJECTION INTRAVENOUS PRN
Status: DISCONTINUED | OUTPATIENT
Start: 2018-01-01 | End: 2018-01-01

## 2018-01-01 RX ORDER — DEXTROSE MONOHYDRATE 25 G/50ML
12.5 INJECTION, SOLUTION INTRAVENOUS PRN
Status: DISCONTINUED | OUTPATIENT
Start: 2018-01-01 | End: 2018-01-01 | Stop reason: SDUPTHER

## 2018-01-01 RX ORDER — ACETAMINOPHEN 325 MG/1
650 TABLET ORAL EVERY 4 HOURS PRN
Status: DISCONTINUED | OUTPATIENT
Start: 2018-01-01 | End: 2018-01-01 | Stop reason: HOSPADM

## 2018-01-01 RX ORDER — FENTANYL CITRATE 50 UG/ML
12.5 INJECTION, SOLUTION INTRAMUSCULAR; INTRAVENOUS ONCE
Status: COMPLETED | OUTPATIENT
Start: 2018-01-01 | End: 2018-01-01

## 2018-01-01 RX ORDER — ETOMIDATE 2 MG/ML
INJECTION INTRAVENOUS
Status: DISCONTINUED
Start: 2018-01-01 | End: 2018-01-01 | Stop reason: WASHOUT

## 2018-01-01 RX ORDER — FENTANYL CITRATE 50 UG/ML
INJECTION, SOLUTION INTRAMUSCULAR; INTRAVENOUS
Status: COMPLETED
Start: 2018-01-01 | End: 2018-01-01

## 2018-01-01 RX ORDER — FENTANYL CITRATE 50 UG/ML
25 INJECTION, SOLUTION INTRAMUSCULAR; INTRAVENOUS ONCE
Status: COMPLETED | OUTPATIENT
Start: 2018-01-01 | End: 2018-01-01

## 2018-01-01 RX ORDER — DEXTROSE, SODIUM CHLORIDE, AND POTASSIUM CHLORIDE 5; .45; .075 G/100ML; G/100ML; G/100ML
INJECTION INTRAVENOUS CONTINUOUS
Status: DISCONTINUED | OUTPATIENT
Start: 2018-01-01 | End: 2018-01-01

## 2018-01-01 RX ORDER — HEPARIN SODIUM 1000 [USP'U]/ML
60 INJECTION, SOLUTION INTRAVENOUS; SUBCUTANEOUS ONCE
Status: COMPLETED | OUTPATIENT
Start: 2018-01-01 | End: 2018-01-01

## 2018-01-01 RX ORDER — CARVEDILOL 6.25 MG/1
12.5 TABLET ORAL 2 TIMES DAILY WITH MEALS
Status: DISCONTINUED | OUTPATIENT
Start: 2018-01-01 | End: 2018-01-01

## 2018-01-01 RX ORDER — POTASSIUM CHLORIDE 29.8 MG/ML
20 INJECTION INTRAVENOUS PRN
Status: DISCONTINUED | OUTPATIENT
Start: 2018-01-01 | End: 2018-01-01 | Stop reason: HOSPADM

## 2018-01-01 RX ORDER — POTASSIUM CHLORIDE 7.45 MG/ML
20 INJECTION INTRAVENOUS PRN
Status: DISCONTINUED | OUTPATIENT
Start: 2018-01-01 | End: 2018-01-01 | Stop reason: SDUPTHER

## 2018-01-01 RX ORDER — NICOTINE POLACRILEX 4 MG
15 LOZENGE BUCCAL PRN
Status: DISCONTINUED | OUTPATIENT
Start: 2018-01-01 | End: 2018-01-01 | Stop reason: HOSPADM

## 2018-01-01 RX ORDER — DEXTROSE AND SODIUM CHLORIDE 5; .45 G/100ML; G/100ML
INJECTION, SOLUTION INTRAVENOUS CONTINUOUS
Status: DISCONTINUED | OUTPATIENT
Start: 2018-01-01 | End: 2018-01-01

## 2018-01-01 RX ORDER — DEXTROSE AND SODIUM CHLORIDE 5; .45 G/100ML; G/100ML
INJECTION, SOLUTION INTRAVENOUS CONTINUOUS PRN
Status: DISCONTINUED | OUTPATIENT
Start: 2018-01-01 | End: 2018-01-01

## 2018-01-01 RX ORDER — SODIUM CHLORIDE 9 MG/ML
INJECTION, SOLUTION INTRAVENOUS CONTINUOUS
Status: DISCONTINUED | OUTPATIENT
Start: 2018-01-01 | End: 2018-01-01

## 2018-01-01 RX ORDER — 0.9 % SODIUM CHLORIDE 0.9 %
30 INTRAVENOUS SOLUTION INTRAVENOUS ONCE
Status: COMPLETED | OUTPATIENT
Start: 2018-01-01 | End: 2018-01-01

## 2018-01-01 RX ORDER — POTASSIUM CHLORIDE 29.8 MG/ML
20 INJECTION INTRAVENOUS ONCE
Status: DISCONTINUED | OUTPATIENT
Start: 2018-01-01 | End: 2018-01-01

## 2018-01-01 RX ORDER — DEXTROSE MONOHYDRATE 25 G/50ML
12.5 INJECTION, SOLUTION INTRAVENOUS PRN
Status: DISCONTINUED | OUTPATIENT
Start: 2018-01-01 | End: 2018-01-01

## 2018-01-01 RX ORDER — LOSARTAN POTASSIUM 25 MG/1
25 TABLET ORAL DAILY
COMMUNITY

## 2018-01-01 RX ORDER — MORPHINE SULFATE 2 MG/ML
2 INJECTION, SOLUTION INTRAMUSCULAR; INTRAVENOUS ONCE
Status: COMPLETED | OUTPATIENT
Start: 2018-01-01 | End: 2018-01-01

## 2018-01-01 RX ORDER — DEXTROSE MONOHYDRATE 50 MG/ML
100 INJECTION, SOLUTION INTRAVENOUS PRN
Status: DISCONTINUED | OUTPATIENT
Start: 2018-01-01 | End: 2018-01-01 | Stop reason: HOSPADM

## 2018-01-01 RX ORDER — NITROGLYCERIN 0.4 MG/1
0.4 TABLET SUBLINGUAL EVERY 5 MIN PRN
Status: DISCONTINUED | OUTPATIENT
Start: 2018-01-01 | End: 2018-01-01 | Stop reason: HOSPADM

## 2018-01-01 RX ORDER — INSULIN GLARGINE 100 [IU]/ML
10 INJECTION, SOLUTION SUBCUTANEOUS NIGHTLY
COMMUNITY

## 2018-01-01 RX ORDER — SODIUM CHLORIDE 0.9 % (FLUSH) 0.9 %
10 SYRINGE (ML) INJECTION PRN
Status: DISCONTINUED | OUTPATIENT
Start: 2018-01-01 | End: 2018-01-01 | Stop reason: HOSPADM

## 2018-01-01 RX ORDER — HEPARIN SODIUM 1000 [USP'U]/ML
60 INJECTION, SOLUTION INTRAVENOUS; SUBCUTANEOUS PRN
Status: DISCONTINUED | OUTPATIENT
Start: 2018-01-01 | End: 2018-01-01 | Stop reason: HOSPADM

## 2018-01-01 RX ORDER — HEPARIN SODIUM 10000 [USP'U]/100ML
12 INJECTION, SOLUTION INTRAVENOUS CONTINUOUS
Status: DISCONTINUED | OUTPATIENT
Start: 2018-01-01 | End: 2018-01-01

## 2018-01-01 RX ADMIN — IPRATROPIUM BROMIDE AND ALBUTEROL SULFATE 1 AMPULE: 2.5; .5 SOLUTION RESPIRATORY (INHALATION) at 19:53

## 2018-01-01 RX ADMIN — IPRATROPIUM BROMIDE AND ALBUTEROL SULFATE 1 AMPULE: 2.5; .5 SOLUTION RESPIRATORY (INHALATION) at 11:46

## 2018-01-01 RX ADMIN — HEPARIN SODIUM 3600 UNITS: 1000 INJECTION, SOLUTION INTRAVENOUS; SUBCUTANEOUS at 21:53

## 2018-01-01 RX ADMIN — POTASSIUM CHLORIDE 20 MEQ: 2 INJECTION, SOLUTION, CONCENTRATE INTRAVENOUS at 01:05

## 2018-01-01 RX ADMIN — Medication 10 ML: at 05:18

## 2018-01-01 RX ADMIN — MORPHINE SULFATE 2 MG: 2 INJECTION, SOLUTION INTRAMUSCULAR; INTRAVENOUS at 12:41

## 2018-01-01 RX ADMIN — POTASSIUM CHLORIDE 20 MEQ: 2 INJECTION, SOLUTION, CONCENTRATE INTRAVENOUS at 12:44

## 2018-01-01 RX ADMIN — POTASSIUM CHLORIDE 20 MEQ: 2 INJECTION, SOLUTION, CONCENTRATE INTRAVENOUS at 23:24

## 2018-01-01 RX ADMIN — CEFTRIAXONE SODIUM 2 G: 2 INJECTION, POWDER, FOR SOLUTION INTRAMUSCULAR; INTRAVENOUS at 14:50

## 2018-01-01 RX ADMIN — SODIUM CHLORIDE 0.08 UNITS/KG/HR: 9 INJECTION, SOLUTION INTRAVENOUS at 15:52

## 2018-01-01 RX ADMIN — IPRATROPIUM BROMIDE AND ALBUTEROL SULFATE 1 AMPULE: 2.5; .5 SOLUTION RESPIRATORY (INHALATION) at 21:12

## 2018-01-01 RX ADMIN — SODIUM CHLORIDE 1824 ML: 9 INJECTION, SOLUTION INTRAVENOUS at 11:44

## 2018-01-01 RX ADMIN — BUDESONIDE 500 MCG: 0.5 SUSPENSION RESPIRATORY (INHALATION) at 19:53

## 2018-01-01 RX ADMIN — BUDESONIDE 500 MCG: 0.5 SUSPENSION RESPIRATORY (INHALATION) at 08:48

## 2018-01-01 RX ADMIN — DEXTROSE MONOHYDRATE, SODIUM CHLORIDE, AND POTASSIUM CHLORIDE: 50; 4.5; .745 INJECTION, SOLUTION INTRAVENOUS at 14:30

## 2018-01-01 RX ADMIN — IPRATROPIUM BROMIDE AND ALBUTEROL SULFATE 1 AMPULE: 2.5; .5 SOLUTION RESPIRATORY (INHALATION) at 00:30

## 2018-01-01 RX ADMIN — IPRATROPIUM BROMIDE AND ALBUTEROL SULFATE 1 AMPULE: 2.5; .5 SOLUTION RESPIRATORY (INHALATION) at 08:48

## 2018-01-01 RX ADMIN — Medication 10 ML: at 22:15

## 2018-01-01 RX ADMIN — FENTANYL CITRATE 112.5 MCG: 50 INJECTION, SOLUTION INTRAMUSCULAR; INTRAVENOUS at 12:37

## 2018-01-01 RX ADMIN — MORPHINE SULFATE 2 MG/HR: 10 INJECTION INTRAVENOUS at 01:38

## 2018-01-01 RX ADMIN — IPRATROPIUM BROMIDE AND ALBUTEROL SULFATE 1 AMPULE: 2.5; .5 SOLUTION RESPIRATORY (INHALATION) at 12:00

## 2018-01-01 RX ADMIN — DEXTROSE AND SODIUM CHLORIDE: 5; 450 INJECTION, SOLUTION INTRAVENOUS at 03:11

## 2018-01-01 RX ADMIN — HEPARIN SODIUM AND DEXTROSE 12 UNITS/KG/HR: 10000; 5 INJECTION INTRAVENOUS at 21:48

## 2018-01-01 RX ADMIN — ENOXAPARIN SODIUM 30 MG: 30 INJECTION SUBCUTANEOUS at 17:06

## 2018-01-01 RX ADMIN — BUDESONIDE 500 MCG: 0.5 SUSPENSION RESPIRATORY (INHALATION) at 08:00

## 2018-01-01 RX ADMIN — DEXTROSE AND SODIUM CHLORIDE: 5; 450 INJECTION, SOLUTION INTRAVENOUS at 17:17

## 2018-01-01 RX ADMIN — IPRATROPIUM BROMIDE AND ALBUTEROL SULFATE 1 AMPULE: 2.5; .5 SOLUTION RESPIRATORY (INHALATION) at 16:35

## 2018-01-01 RX ADMIN — IPRATROPIUM BROMIDE AND ALBUTEROL SULFATE 1 AMPULE: 2.5; .5 SOLUTION RESPIRATORY (INHALATION) at 16:34

## 2018-01-01 RX ADMIN — CEFTRIAXONE SODIUM 1 G: 1 INJECTION, POWDER, FOR SOLUTION INTRAMUSCULAR; INTRAVENOUS at 05:18

## 2018-01-01 RX ADMIN — DEXTROSE AND SODIUM CHLORIDE: 5; 450 INJECTION, SOLUTION INTRAVENOUS at 20:26

## 2018-01-01 RX ADMIN — Medication 10 ML: at 22:01

## 2018-01-01 RX ADMIN — SODIUM CHLORIDE 0.11 UNITS/KG/HR: 9 INJECTION, SOLUTION INTRAVENOUS at 17:03

## 2018-01-01 RX ADMIN — PANTOPRAZOLE SODIUM 40 MG: 40 INJECTION, POWDER, FOR SOLUTION INTRAVENOUS at 12:41

## 2018-01-01 RX ADMIN — HEPARIN SODIUM 1800 UNITS: 1000 INJECTION, SOLUTION INTRAVENOUS; SUBCUTANEOUS at 04:43

## 2018-01-01 RX ADMIN — DEXTROSE, SODIUM CHLORIDE, AND POTASSIUM CHLORIDE: 5; .45; .15 INJECTION INTRAVENOUS at 18:00

## 2018-01-01 RX ADMIN — HEPARIN SODIUM AND DEXTROSE 14 UNITS/KG/HR: 10000; 5 INJECTION INTRAVENOUS at 06:20

## 2018-01-01 RX ADMIN — FENTANYL CITRATE 25 MCG: 50 INJECTION, SOLUTION INTRAMUSCULAR; INTRAVENOUS at 01:09

## 2018-01-01 RX ADMIN — VANCOMYCIN HYDROCHLORIDE 1000 MG: 1 INJECTION, POWDER, LYOPHILIZED, FOR SOLUTION INTRAVENOUS at 11:34

## 2018-01-01 RX ADMIN — SODIUM CHLORIDE 0.15 UNITS/KG/HR: 9 INJECTION, SOLUTION INTRAVENOUS at 14:42

## 2018-01-01 RX ADMIN — SODIUM CHLORIDE 4.36 UNITS/HR: 9 INJECTION, SOLUTION INTRAVENOUS at 00:12

## 2018-01-01 RX ADMIN — BUDESONIDE 500 MCG: 0.5 SUSPENSION RESPIRATORY (INHALATION) at 20:33

## 2018-01-01 RX ADMIN — Medication 10 ML: at 20:27

## 2018-01-01 RX ADMIN — SODIUM PHOSPHATE, MONOBASIC, MONOHYDRATE AND SODIUM PHOSPHATE, DIBASIC, ANHYDROUS 10 MMOL: 276; 142 INJECTION, SOLUTION INTRAVENOUS at 01:57

## 2018-01-01 RX ADMIN — SODIUM CHLORIDE 0.1 UNITS/KG/HR: 9 INJECTION, SOLUTION INTRAVENOUS at 13:36

## 2018-01-01 RX ADMIN — PANTOPRAZOLE SODIUM 40 MG: 40 INJECTION, POWDER, FOR SOLUTION INTRAVENOUS at 08:44

## 2018-01-01 RX ADMIN — Medication 10 ML: at 08:44

## 2018-01-01 RX ADMIN — IPRATROPIUM BROMIDE AND ALBUTEROL SULFATE 1 AMPULE: 2.5; .5 SOLUTION RESPIRATORY (INHALATION) at 20:33

## 2018-01-01 RX ADMIN — Medication: at 01:10

## 2018-01-01 RX ADMIN — MAGNESIUM SULFATE HEPTAHYDRATE 2 G: 40 INJECTION, SOLUTION INTRAVENOUS at 23:22

## 2018-01-01 RX ADMIN — MAGNESIUM SULFATE HEPTAHYDRATE 2 G: 40 INJECTION, SOLUTION INTRAVENOUS at 01:00

## 2018-01-01 RX ADMIN — CEFTRIAXONE SODIUM 1 G: 1 INJECTION, POWDER, FOR SOLUTION INTRAMUSCULAR; INTRAVENOUS at 06:28

## 2018-01-01 RX ADMIN — Medication 10 ML: at 12:41

## 2018-01-01 RX ADMIN — POTASSIUM CHLORIDE 20 MEQ: 2 INJECTION, SOLUTION, CONCENTRATE INTRAVENOUS at 18:21

## 2018-01-01 RX ADMIN — IPRATROPIUM BROMIDE AND ALBUTEROL SULFATE 1 AMPULE: 2.5; .5 SOLUTION RESPIRATORY (INHALATION) at 08:00

## 2018-01-01 ASSESSMENT — PAIN SCALES - GENERAL
PAINLEVEL_OUTOF10: 0
PAINLEVEL_OUTOF10: 5
PAINLEVEL_OUTOF10: 4
PAINLEVEL_OUTOF10: 4
PAINLEVEL_OUTOF10: 1
PAINLEVEL_OUTOF10: 2
PAINLEVEL_OUTOF10: 7
PAINLEVEL_OUTOF10: 0
PAINLEVEL_OUTOF10: 5
PAINLEVEL_OUTOF10: 2
PAINLEVEL_OUTOF10: 4
PAINLEVEL_OUTOF10: 3
PAINLEVEL_OUTOF10: 2
PAINLEVEL_OUTOF10: 4

## 2018-02-16 PROBLEM — I63.511 CEREBROVASCULAR ACCIDENT (CVA) DUE TO OCCLUSION OF RIGHT MIDDLE CEREBRAL ARTERY (HCC): Status: ACTIVE | Noted: 2018-01-01

## 2018-03-10 PROBLEM — E10.10 DKA, TYPE 1, NOT AT GOAL (HCC): Status: ACTIVE | Noted: 2018-01-01

## 2018-03-10 NOTE — ED PROVIDER NOTES
drugs. Family History: family history is not on file. The patients home medications have been reviewed. Allergies: Influenza vaccines; Pneumococcal vaccines;  Shellfish-derived products; and Fish-derived products    -------------------------------------------------- RESULTS -------------------------------------------------  All laboratory and radiology results have been personally reviewed by myself   LABS:  Results for orders placed or performed during the hospital encounter of 03/10/18   Troponin   Result Value Ref Range    Troponin 6.99 (H) 0.00 - 0.03 ng/mL   CBC Auto Differential   Result Value Ref Range    WBC 15.6 (H) 4.5 - 11.5 E9/L    RBC 4.14 3.50 - 5.50 E12/L    Hemoglobin 12.5 11.5 - 15.5 g/dL    Hematocrit 38.1 34.0 - 48.0 %    MCV 92.0 80.0 - 99.9 fL    MCH 30.2 26.0 - 35.0 pg    MCHC 32.8 32.0 - 34.5 %    RDW 15.3 (H) 11.5 - 15.0 fL    Platelets 923 975 - 302 E9/L    MPV 11.6 7.0 - 12.0 fL    Neutrophils % 84.9 (H) 43.0 - 80.0 %    Immature Granulocytes % 0.6 0.0 - 5.0 %    Lymphocytes % 9.1 (L) 20.0 - 42.0 %    Monocytes % 5.3 2.0 - 12.0 %    Eosinophils % 0.0 0.0 - 6.0 %    Basophils % 0.1 0.0 - 2.0 %    Neutrophils # 13.20 (H) 1.80 - 7.30 E9/L    Immature Granulocytes # 0.09 E9/L    Lymphocytes # 1.41 (L) 1.50 - 4.00 E9/L    Monocytes # 0.83 0.10 - 0.95 E9/L    Eosinophils # 0.00 (L) 0.05 - 0.50 E9/L    Basophils # 0.02 0.00 - 0.20 E9/L   Comprehensive Metabolic Panel   Result Value Ref Range    Sodium 132 132 - 146 mmol/L    Potassium 4.9 3.5 - 5.0 mmol/L    Chloride 84 (L) 98 - 107 mmol/L    CO2 18 (L) 22 - 29 mmol/L    Anion Gap 30 (H) 7 - 16 mmol/L    Glucose 472 (H) 74 - 109 mg/dL    BUN 28 (H) 8 - 23 mg/dL    CREATININE 2.5 (H) 0.5 - 1.0 mg/dL    GFR Non-African American 23 >=60 mL/min/1.73    GFR African American 23     Calcium 9.5 8.6 - 10.2 mg/dL    Total Protein 7.8 6.4 - 8.3 g/dL    Alb 3.6 3.5 - 5.2 g/dL    Total Bilirubin 1.7 (H) 0.0 - 1.2 mg/dL    Alkaline Phosphatase 123 COCAINE METABOLITE SCREEN URINE NOT DETECTED Negative < 300 ng/mL    Opiate Scrn, Ur NOT DETECTED Negative < 300ng/mL    PCP Scrn, Ur NOT DETECTED Negative < 25 ng/mL    Methadone Screen, Urine NOT DETECTED Negative <300 ng/mL    Propoxyphene Scrn, Ur NOT DETECTED Negative <300 ng/mL   Ammonia   Result Value Ref Range    Ammonia 14.0 11.0 - 51.0 umol/L   Serum Drug Screen   Result Value Ref Range    Ethanol Lvl <10 mg/dL    Acetaminophen Level <15.0 10.0 - 96.3 mcg/mL    Salicylate, Serum <2.6 0.0 - 30.0 mg/dL    TCA Scrn NEGATIVE Cutoff:300 ng/mL   Beta-Hydroxybutyrate   Result Value Ref Range    Beta-Hydroxybutyrate 1.75 (H) 0.02 - 0.27 mmol/L   Microscopic Urinalysis   Result Value Ref Range    WBC, UA 0-1 0 - 5 /HPF    RBC, UA 0-1 0 - 2 /HPF    Bacteria, UA MODERATE (A) /HPF    Amorphous, UA MODERATE    CK   Result Value Ref Range    Total CK 3,785 (H) 20 - 180 U/L   Phosphorus   Result Value Ref Range    Phosphorus 3.0 2.5 - 4.5 mg/dL   Hemoglobin A1c   Result Value Ref Range    Hemoglobin A1C 8.8 (H) 4.8 - 5.9 %   Protime-INR   Result Value Ref Range    Protime 21.0 (H) 9.3 - 12.4 sec    INR 1.9    URINE ELECTROLYTES   Result Value Ref Range    Sodium, Ur 29 Not Established mmol/L    Potassium, Ur 50.5 Not Established mmol/L    Chloride 24 Not Established mmol/L   CREATININE, RANDOM URINE   Result Value Ref Range    Creatinine, Ur 344 (H) 29 - 226 mg/dL   UREA NITROGEN, URINE   Result Value Ref Range    Urea Nitrogen, Ur 318 (L) 800 - 1666 mg/dL   Troponin   Result Value Ref Range    Troponin 7.49 (H) 0.00 - 0.03 ng/mL   Bilirubin, direct   Result Value Ref Range    Bilirubin, Direct 0.6 (H) 0.0 - 0.3 mg/dL   Bilirubin, total   Result Value Ref Range    Total Bilirubin 1.0 0.0 - 1.2 mg/dL   Indirect Bilirubin   Result Value Ref Range    Bilirubin, Indirect 0.4 0.0 - 1.0 mg/dL   POCT Venous   Result Value Ref Range    POC Sodium 129 (L) 132 - 146 mmol/L    POC Potassium 5.2 (H) 3.5 - 5.0 mmol/L    POC hemorrhage is identified. 3. Moderate cerebral volume loss/atrophy. 4. Vascular calcifications within the bilateral internal carotid   artery cavernous segments and the vertebral arteries. .      XR CHEST PORTABLE   Final Result      1. Limited visualization of the left lung base. 2. No obvious airspace opacity or pleural effusion. XR CHEST PORTABLE    (Results Pending)       ------------------------- NURSING NOTES AND VITALS REVIEWED ---------------------------   The nursing notes within the ED encounter and vital signs as below have been reviewed. BP 96/64   Pulse 103   Temp 98.9 °F (37.2 °C) (Temporal)   Resp (!) 33   Ht 5' (1.524 m)   Wt 134 lb (60.8 kg)   SpO2 100%   BMI 26.17 kg/m²   Oxygen Saturation Interpretation: Abnormal      ---------------------------------------------------PHYSICAL EXAM--------------------------------------       Constitutional/General: Open sore eyes but does not interact or say anything  Head: NC/AT. There appears to be some left-sided facial droop  Eyes: PERRL, EOMI  Mouth: Oropharynx clear, handling secretions, no trismus  Neck: Supple, full ROM, no meningeal signs  Pulmonary: Lungs sounds diminished, irregular breathing but maintaining airway, in respiratory distress  Cardiovascular:  Tachycardic rate and rhythm, no murmurs, gallops, or rubs. 2+ distal pulses  Abdomen: Soft, non tender, non distended,   Extremities: Moves all extremities x 4. Warm and well perfused  Skin: warm and dry without rash  Neurologic: Glascow Coma Scale. She does reach his bilateral upper extremities, she does move the lower extremities in response to pain, appears generally symmetrical  Best Eye Response 4 - Opens eyes on own   Best Verbal Response 1 - Makes no noise   Best Motor Response 4 - Moves part of body but does not remove noxious stimulus   Total 9     EKG: This EKG is signed and interpreted by me.     Rate: 115  Rhythm: Ventricularly paced  Interpretation: Ventricularly

## 2018-03-10 NOTE — H&P
[facility] Resident Inpatient  History and Physical      CC: Altered Mental Status (FOUND AT HOME; KUSSMAUL RESPIRATIONS; HX DKA; ; RESPONDS TO PAIN; PERIODS OF APNEA; 88% BVM )    HPI: History obtained from electronic medical record. Tierney Castellon is a 74 y.o. female with a PMH of CAD s/p CABG, MCA stroke, chronic HFrEF, HTN, HLD, breast cancer and DMII  who presents to ED for altered mental status. Pt was found at home altered and in respiratory distress. Her last known well was 1 day prior to admission. She was found by EMS with kussmal respirations, responding only to pain. Blood sugar was 400. There was no family at bedside to provide further history. ED Course: The patient remained hemodynamically stable on 15L high flow NC. Labs were significant for DKA with bicarb of 18, anion gap of 30,  b-hydroxybuterate of 1.75; she had NORMA with Cr of 2.5; LA was 7.5; glucose was 449; trop 6.99  Imaging: CXR was wnl; CT head was pending  EKG: ventricular paced, unchanged from previous tracings. Patient was started on DKA protocol  Pt admitted for DKA, acute hypoxic respiratory failure    PMH:  has a past medical history of Arthritis; Asthma; Breast cancer ([address] CAD (coronary artery disease); CHF (congestive heart failure) ([address] Diabetes ([facility] Ejection fraction < 50%; HFrEF (heart failure with reduced ejection fraction) ([address] Hyperlipemia; Hypertension; Hyperthyroidism; Ischemic cardiomyopathy; Kidney failure; and Sleep apnea. PSH:  has a past surgical history that includes Coronary artery bypass graft (1996); Cardiac defibrillator placement (2010); Breast surgery (Left); Diagnostic Cardiac Cath Lab Procedure; and partial hysterectomy (cervix not removed). FH: family history is not on file. Social:  reports that she has never smoked. She has never used smokeless tobacco. She reports that she does not drink alcohol or use drugs. Allergies:    Allergies   Allergen Reactions

## 2018-03-10 NOTE — PLAN OF CARE
Problem: Risk for Impaired Skin Integrity  Goal: Tissue integrity - skin and mucous membranes  Structural intactness and normal physiological function of skin and  mucous membranes.    Outcome: Not Met This Shift      Problem: Falls - Risk of  Goal: Absence of falls  Outcome: Met This Shift

## 2018-03-11 PROBLEM — E11.65 TYPE 2 DIABETES MELLITUS WITH HYPERGLYCEMIA, WITH LONG-TERM CURRENT USE OF INSULIN (HCC): Status: ACTIVE | Noted: 2017-01-01

## 2018-03-11 NOTE — CONSULTS
Palliative Medicine   Consult Note  Clinical Nurse Specialist    Requesting Provider: Dr. Herman Randhawa Date: 3/10/2018  LOS-1  Reason for Consult:    __x__ Advance Care Planning  __x___Establish Goals of Care  __x___ Psychosocial Support  _____Symptom Management    Recommendations:    1. Advanced Care Planning: Daughter noelle Agarwal [phone] granddaughter Tata Villavicencio for chart review has no advance directives  2. Code status: Note to all resuscitative measures limited code status  3. Anticipatory Guidance: continues with breast cancer treatment with Dr. Wei Daniels, daughter relays to me that she thinks pt needs placed in a nursing facility she does not believe that she can continue to care for her at home, daughter is appreciative of palliative medicine support patient has only been in the [place] for a year she moved from [facility].  Has 2 other children in [place]  4. Goals of care: continue  current care  5. Psychosocial Support: Family inpatient  The PM outpatient clinic for symptom management was discussed. An attempt to coordinate an outpatient visit after discharge will be made, if the patient is agreeable. CC:  Altered mental status    History Obtained From:  family member - daughter , electronic medical record    HISTORY OF PRESENT ILLNESS:                Brooklynn Flannery is a 74 y.o. female with significant past medical history of arthritis asthma breast cancer last note on 2/19/2018 from Dr. Wei Daniels oncology as the patient was continuing Arimidex for her breast cancer T3 N1 M0, CAD, CHF, diabetes mellitus, EF less than 50% hyperlipidemia, hypertension, kidney failure, sleep apnea, who presented with altered mental status and respiratory distress. Admitted for DKA UTI and altered mental status. Cardiology consult and non-STEMI  Palliative Medicine has been consulted and was support goals of care discussion code status.     Patient seen in room she is still confused and lethargic, daughter at bedside. Daughter tearful at times, she voices feelings of being overwhelmed. She relates that she has 2 other siblings but they live in [place] and are unable to help often. I'll continue to follow daughter given our contact information and brochure.     Past Medical History:    Past Medical History:   Diagnosis Date    Arthritis     Asthma     Breast cancer ([facility]     CAD (coronary artery disease)     [facility]     Diabetes ([facility]     Ejection fraction < 50% 11/01/2016    35-40%    HFrEF (heart failure with reduced ejection fraction) ([facility] 12/07/2016    10/31/16- echo- [id number]%, mild MR, mild TR, moderate pulmonary hypertension    Hyperlipemia     Hypertension     Hyperthyroidism     Ischemic cardiomyopathy     Kidney failure     Sleep apnea        Past Surgical History:      Past Surgical History:   Procedure Laterality Date    BREAST SURGERY Left     CARDIAC DEFIBRILLATOR PLACEMENT  2010    CORONARY ARTERY BYPASS GRAFT  1996    DIAGNOSTIC CARDIAC CATH LAB PROCEDURE      PARTIAL HYSTERECTOMY         Current Medications:     sodium chloride flush  10 mL Intravenous 2 times per day    cefTRIAXone (ROCEPHIN) IV  1 g Intravenous Q24H    pantoprazole  40 mg Intravenous Daily    ipratropium-albuterol  1 ampule Inhalation Q4H WA    budesonide  500 mcg Nebulization BID    potassium chloride  20 mEq Intravenous Once       PRN Medications:  sodium chloride flush, acetaminophen, magnesium hydroxide, ondansetron, sodium phosphate IVPB **OR** sodium phosphate IVPB **OR** sodium phosphate IVPB, glucose, dextrose, glucagon (rDNA), dextrose, heparin (porcine), heparin (porcine), potassium chloride, magnesium sulfate    IV Medications:   sodium chloride      insulin (HUMAN R) weight based infusion 0.86 Units/hr (03/11/18 0912)    dextrose      dextrose 5 % and 0.45 % NaCl 100 mL/hr at 03/10/18 2026    heparin (porcine) 12 Units/kg/hr (03/10/18 graft)    Canadian speaking patient    Coronary artery disease involving native coronary artery of native heart without angina pectoris    CKD (chronic kidney disease)    Acquired hypothyroidism    Valvular heart disease    Ischemic cardiomyopathy    Type 2 diabetes mellitus with diabetic nephropathy, with long-term current use of insulin (HCC)    Essential hypertension    Non morbid obesity due to excess calories    Asthma    Rectal bleeding    Chronic systolic CHF (congestive heart failure) (HCC)    Bronchitis    Parainfluenza infection    Malignant neoplasm of central portion of left female breast ([facility]    Type 2 diabetes mellitus with diabetic nephropathy, with long-term current use of insulin (HCC)    Acute ischemic stroke ([facility]    Hyperlipidemia    CAD (coronary artery disease)    Cerebrovascular accident (CVA) due to occlusion of right middle cerebral artery ([facility]    DKA, type 1, not at goal [facility])         Assessment:/Plan: This is a 74 y.o. female with significant past medical history of breast cancer, heart disease found to have altered mental status is now in ICU for DKA. Limited no to all resuscitative measures  With daughter at bedside 4 times she is concerned about her mother's poor health support provided  Continue to follow closely and support as needed      Thank you for allowing us to participate in the care of this patient. Isra CHILDS,ACHPN 3/11/2018 9:42 AM  Note: This report was completed using computerized recognition software. Every effort has been made to ensure accuracy; however, inadvertent computerized transcription errors may be present. Time in minutes: 50 min    More than 50% of this interaction was related to counseling or information given regarding goals of care, code status.       Patient assessment and plan discussed with Dr. Quinton Johnson

## 2018-03-11 NOTE — CONSULTS
Reason for consult: NSTEMI  Pt known to Dr Graeme Rodriguez    I personally saw, examined, and evaluated the patient today. I personally reviewed medications, rhythm strips, pertinent labs and test reports. I directly participated in the medical-decision making and medication adjustment. Focused cardiac exam:    Vitals:    03/11/18 1100   BP: (!) 89/53   Pulse: 97   Resp: 12   Temp:    SpO2: 100%         On Sedated  Neck: No elevated JVP, no carotid bruit  Heart:  Regular, no S3, no rub, 1/6 systolic murmur  Lungs: Clear except few scattered rhonchi  Ext: No edema      Impression/Recommendations:    NSTEMI:  On Heparin, No BB due to hypotension, No ACE/ARB due to NORMA; ASA 81 mg if she tolerates; High risk for Cardiac cath    Hypotension: Continue iv fluids; may need inotropic support    NORMA: Continue iv fluids, monitor renal Fn    Possible septic shock    Cardiomyopathy    CAD s/p CABG    Severe pulmonary HTN:     s/p ICD    Anemia: Monitor H/H          D/W family and all questions answered. Thank you for the consult. Full  consult notes to follow.       Radhika Woodall MD  3/11/2018  11:49 AM  [facility]) Cardiology

## 2018-03-11 NOTE — CONSULTS
for blood glucose checks three times a day. 10/20/17  Yes Anna Porter, [address] [place] [address] by Does not apply route 10/4/17  Yes Shelton Ugarte MD   furosemide (LASIX) 40 MG tablet Take 1 tablet by mouth every other day  Patient taking differently: Take 40 mg by mouth daily  9/27/17  Yes Brenda Grossman MD   carvedilol (COREG) 12.5 MG tablet Take 12.5 mg by mouth 2 times daily (with meals)    Yes Historical Provider, MD   acetaminophen (TYLENOL) 325 MG tablet Take 2 tablets by mouth every 6 hours as needed for Pain (abdominal pain) 9/8/17  Yes Brenda Grossman MD   Blood Glucose Monitoring Suppl (FREESTYLE LITE) HONG UTD 5/31/17  Yes Historical Provider, MD   INSULIN SYRINGE 1CC/29G 29G X 1/2\" 1 ML MISC 1 each by Does not apply route 2 times daily Substitute as needed. 5/18/17  Yes Davis Flaherty DO   glucose monitoring kit (FREESTYLE) monitoring kit Use once. 5/18/17  Yes Davis Flaherty DO   meclizine (ANTIVERT) 12.5 MG tablet Take 12.5 mg by mouth 3 times daily as needed    Historical Provider, MD   Misc.  Devices [facility]) MISC 1 Units by Does not apply route daily 10/4/17   Shelton Ugarte MD       Current Medications:    Current Facility-Administered Medications: sodium chloride flush 0.9 % injection 10 mL, 10 mL, Intravenous, 2 times per day  sodium chloride flush 0.9 % injection 10 mL, 10 mL, Intravenous, PRN  acetaminophen (TYLENOL) tablet 650 mg, 650 mg, Oral, Q4H PRN  magnesium hydroxide (MILK OF MAGNESIA) 400 MG/5ML suspension 30 mL, 30 mL, Oral, Daily PRN  ondansetron (ZOFRAN) injection 4 mg, 4 mg, Intravenous, Q6H PRN  sodium phosphate 10 mmol in dextrose 5 % 250 mL IVPB, 10 mmol, Intravenous, PRN **OR** sodium phosphate 15 mmol in dextrose 5 % 250 mL IVPB, 15 mmol, Intravenous, PRN **OR** sodium phosphate 20 mmol in dextrose 5 % 500 mL IVPB, 20 mmol, Intravenous, PRN  0.9 % sodium chloride infusion, , Intravenous, Continuous  cefTRIAXone (ROCEPHIN) 1 g in dextrose 5 % 100 mL IVPB HTN:      s/p ICD     Anemia: Monitor H/H              D/W family and all questions answered.     Thank you for the consult.     Bonilla Vega MD  3/11/2018  11:49 AM  [facility]) Cardiology

## 2018-03-11 NOTE — PLAN OF CARE
Patient does not have living will or health care power of . Spoke with her eldest daughter and next of kin, Denver Dub, who states that her mother has expressed that she would not want any resuscitative measures. No chest compression, shock, resuscitative medications or intubation. Code status changed in chart.     Molly Homans M.D., PGY 3  Internal Medicine    Attending: Dr. Nilo Jolly

## 2018-03-11 NOTE — PLAN OF CARE
Problem: Nutrition  Goal: Optimal nutrition therapy  Outcome: Ongoing  Nutrition Problem: Inadequate oral intake  Intervention: Food and/or Nutrient Delivery: Continue NPO  Nutritional Goals: Nutrition progression

## 2018-03-12 NOTE — CONSULTS
 CORONARY ARTERY BYPASS GRAFT  1996    DIAGNOSTIC CARDIAC CATH LAB PROCEDURE      PARTIAL HYSTERECTOMY         Current Medications:    sodium chloride      insulin (HUMAN R) weight based infusion 1.32 Units/hr (03/12/18 0412)    dextrose      dextrose 5 % and 0.45 % NaCl 100 mL/hr at 03/12/18 0311    heparin (porcine) 14 Units/kg/hr (03/12/18 0442)      sodium chloride flush, acetaminophen, magnesium hydroxide, ondansetron, sodium phosphate IVPB **OR** sodium phosphate IVPB **OR** sodium phosphate IVPB, glucose, dextrose, glucagon (rDNA), dextrose, heparin (porcine), heparin (porcine), potassium chloride, magnesium sulfate    sodium chloride flush  10 mL Intravenous 2 times per day    cefTRIAXone (ROCEPHIN) IV  1 g Intravenous Q24H    pantoprazole  40 mg Intravenous Daily    ipratropium-albuterol  1 ampule Inhalation Q4H WA    budesonide  500 mcg Nebulization BID    potassium chloride  20 mEq Intravenous Once        Allergies:  Influenza vaccines; Pneumococcal vaccines; Shellfish-derived products; and Fish-derived products    Social History     Social History    Marital status:      Spouse name: N/A    Number of children: N/A    Years of education: N/A     Occupational History    Not on file. Social History Main Topics    Smoking status: Never Smoker    Smokeless tobacco: Never Used    Alcohol use No    Drug use: No    Sexual activity: Not Currently     Other Topics Concern    Not on file     Social History Narrative    No narrative on file        History reviewed. No pertinent family history. PHYSICAL EXAM:    /70   Pulse 109   Temp 99.7 °F (37.6 °C) (Axillary)   Resp 24   Ht 5' (1.524 m)   Wt 143 lb 6.4 oz (65 kg)   SpO2 100%   BMI 28.01 kg/m²     General:NAD, awake and alert. Head: Normocephalic, atraumatic  Eyes: PERRLA, EOMI. No sclera icterus. Lungs: No increased work of breathing. Cardiovascular: RRR. Abdomen: Soft, ND, NT.  No rebound, guarding or

## 2018-03-13 NOTE — PROGRESS NOTES
[facility]  Family Medicine Attending    S: 74 y.o. female with a hx of DM, breast cancer, CVAs, ICD placement, OA, Ischemic cardiomyopathy, htn, systolic heart failure, cad s/p CABG, and asthma presented with confusion. Daughter states that she has been lethargic since she was discharged from the nursing home one month ago. She has been hallucinating seeing people who are  and not wanting to participate in PT as well as not eating. She lives with her dtr. She noticed that she had emesis on her with irregular breathing and called EMS. She was irregularly breathing and placed on 15L NB mask but has been weaned to NC. Found to have DKA and an UTI in the ER, now NSTEMI. Patient with chest pain. O: VS- Blood pressure 111/87, pulse 106, temperature 100 °F (37.8 °C), resp. rate (!) 39, height 5' (1.524 m), weight 143 lb 6.4 oz (65 kg), SpO2 100 %, not currently breastfeeding.   Exam is as noted by resident with the following changes, additions or corrections:  Gen: NAD on NC oxygen  HEENT:NCAt  CV-RRR systolic murmur 6-38  Lungs-diminished bs anteriorly  ABD-soft nonttp no masses   Ext-no C/C/E; cool, poor pulses        Impressions:   Principal Problem:    DKA, type 1, not at goal [facility])  Active Problems:    ICD (implantable cardioverter-defibrillator), biventricular, in situ    S/P CABG (coronary artery bypass graft)    A[place]ijani speaking patient    Coronary artery disease involving native coronary artery of native heart without angina pectoris    CKD (chronic kidney disease)    Essential hypertension    Hyperlipidemia    Cerebrovascular accident (CVA) due to occlusion of right middle cerebral artery ([address]    Diabetic acidosis without coma ([address]    NORMA (acute kidney injury) ([address]    Metabolic encephalopathy    Urinary tract infection without hematuria    Palliative care by specialist  Resolved Problems:    Diabetes ([address]    Acute respiratory failure with hypoxia ([address]      Plan:
Date: 3/13/2018    Time: 1:20 AM    Patient Placed On BIPAP/CPAP/ Non-Invasive Ventilation? Yes    If no must comment. Facial area red/color change? No           If YES are Blister/Lesion present? No   If yes must notify nursing staff  BIPAP/CPAP skin barrier?   Yes    Skin barrier type:Liquicel       Comments:        Faustino López, RRT
Nutrition Assessment    Type and Reason for Visit: Initial, Positive Nutrition Screen    Malnutrition Assessment:  · Malnutrition Status: Insufficient data  · Context: Chronic illness  · Findings of the 6 clinical characteristics of malnutrition (Minimum of 2 out of 6 clinical characteristics is required to make the diagnosis of moderate or severe Protein Calorie Malnutrition based on AND/ASPEN Guidelines):  1. Energy Intake-Not available, not able to assess    2. Weight Loss-10% loss or greater, in 3 months  3. Fat Loss-No significant subcutaneous fat loss,    4. Muscle Loss-Mild muscle mass loss,    5. Fluid Accumulation-No significant fluid accumulation,    6.  Strength-Not measured    Nutrition Diagnosis:   · Problem: Inadequate oral intake  · Etiology: related to Impaired respiratory function-inability to consume food     Signs and symptoms:  as evidenced by NPO status due to medical condition    Nutrition Assessment:  · Nutrition-Focused Physical Findings: hypoactive BS, abd distention, metabolic encephalopathy, Togolese speaking, no noted edema, + I/Os, noted adm /A1C 8.8  · Wound Type: Multiple (noted abrasions)  · Current Nutrition Therapies:  · Oral Diet Orders: NPO   · Anthropometric Measures:  · Ht: 5' (152.4 cm)   · Current Body Wt: 143 lb (64.9 kg) (3/11 actual)  · Admission Body Wt: 134 lb (60.8 kg) (3/10 estimated)  · Usual Body Wt: 162 lb (73.5 kg) (12/2017 per EMR, actual)  · % Weight Change: noted 11.7% wt loss x3 months ,     · Ideal Body Wt: 100 lb (45.4 kg), % Ideal Body 143%  · BMI Classification: BMI 25.0 - 29.9 Overweight  · Comparative Standards (Estimated Nutrition Needs):  · Estimated Daily Total Kcal:  (MSJ 1077 x1.2= 1292)  · Estimated Daily Protein (g): 75-90 (1.2-1.4gm/kg CBW)    Estimated Intake vs Estimated Needs: Intake Less Than Needs    Nutrition Risk Level: Moderate    Nutrition Interventions:   Continue NPO Advance nutrition as medically appropriate.
Pulling at lines/EKG leads and unable to follow directions. Bilateral soft wrist restraints applied.
Reason for follow up:  NSTEMI; ISCMP; DKA    Subjective:  Denies CP. SOB +. Objective:    No distress. No events overnight. Scheduled Meds:   sodium chloride flush  10 mL Intravenous 2 times per day    cefTRIAXone (ROCEPHIN) IV  1 g Intravenous Q24H    pantoprazole  40 mg Intravenous Daily    ipratropium-albuterol  1 ampule Inhalation Q4H WA    budesonide  500 mcg Nebulization BID    potassium chloride  20 mEq Intravenous Once       Continuous Infusions:   sodium chloride      insulin (HUMAN R) weight based infusion 0.7 Units/hr (03/12/18 0620)    dextrose      dextrose 5 % and 0.45 % NaCl 100 mL/hr at 03/12/18 0311    heparin (porcine) 14 Units/kg/hr (03/12/18 0620)           Intake/Output Summary (Last 24 hours) at 03/12/18 0653  Last data filed at 03/12/18 0510   Gross per 24 hour   Intake             1798 ml   Output              288 ml   Net             1510 ml       Patient Vitals for the past 96 hrs (Last 3 readings):   Weight   03/11/18 0600 143 lb 6.4 oz (65 kg)   03/10/18 1127 134 lb (60.8 kg)          PE:   BP 89/60   Pulse 108   Temp 99.7 °F (37.6 °C) (Axillary)   Resp 30   Ht 5' (1.524 m)   Wt 143 lb 6.4 oz (65 kg)   SpO2 100%   BMI 28.01 kg/m²   CONST: In general, this is a well developed, elderly female who appears of stated age. Awake, alert, cooperative, no apparent distress. Throat: Oral mucosa pink and moist.  HEENT: Head- normocephalic, atraumatic; Eyes:conjunctivae pink, no noted xanthomas. Neck: no stridor, no noted enlargement of the thyroid,symmetric, no tenderness, no jugular venous distention. No carotid bruit noted. CHEST: Symmetrical and non-tender to palpation. No noted accessory muscle use or intercostal retractions. LUNGS: diminished AE b/l; no creps; no ronchi.    CARDIOVASCULAR:   Heart Inspection shows no noted pulsations  Heart Palpation: no heaves or thrills, PMI in 5th ISC near left midclavicular line   Heart
(porcine) 12 Units/kg/hr (03/10/18 2148)     Scheduled meds:    sodium chloride flush  10 mL Intravenous 2 times per day    cefTRIAXone (ROCEPHIN) IV  1 g Intravenous Q24H    pantoprazole  40 mg Intravenous Daily    ipratropium-albuterol  1 ampule Inhalation Q4H WA    budesonide  500 mcg Nebulization BID    potassium chloride  20 mEq Intravenous Once     PRN meds: sodium chloride flush, acetaminophen, magnesium hydroxide, ondansetron, sodium phosphate IVPB **OR** sodium phosphate IVPB **OR** sodium phosphate IVPB, glucose, dextrose, glucagon (rDNA), dextrose, heparin (porcine), heparin (porcine), potassium chloride, magnesium sulfate     Labs:  Na/K/Cl/CO2:  139/4.6/100/17 (415)  BUN/Cr/glu/ALT/AST/amyl/lip:  34/2.0/--/--/--/--/-- (415)  WBC/Hgb/Hct/Plts:  18.7/10.5/31.9/124 (415)  estimated creatinine clearance is 21 mL/min (A) (based on SCr of 2 mg/dL (H)). Other pertinent labs as noted below    New Imaging:  Ct Head Wo Contrast    Result Date: 3/10/2018  Patient MRN: [id number] : 1945 Age:  74 years Gender: Female Order Date: 3/10/2018 12:45 PM Exam: CT HEAD WO CONTRAST Number of Views: 118 Indication:  Altered mental status, found at home with [place] respirations, Comparison: Head CT 2017. Findings: Encephalomalacic changes right frontal lobe, right parietal lobe, right temporal lobe and right occipital lobe, noted on the previous exam of 2017. No intraparenchymal hemorrhage is identified. Moderate cerebral volume loss/atrophy. No subfalcine, transtentorial or tonsillar herniation or shift. Paranasal sinuses and mastoid air cells are free from fluid or mass. Vascular calcifications within the bilateral internal carotid artery cavernous segments and the vertebral arteries. .     ALERT:  THIS IS AN ABNORMAL REPORT.  Findings communicated directly with Dr. Sylvie Schwab at approximately 3/10/2018 5:00 PM . 1. Encephalomalacia in the right frontal lobe, right parietal lobe,
(H) 03/10/2018    TROPONINI 7.49 (H) 03/10/2018         Resident's Assessment and Plan     Assessment:  - Acute vs. Chronic Encephalopathy   - NSTEMI   - Aspiration pneumonia   - DKA  - NORMA on CKD  - Hx of CVA  - Hx of Ischemic CMP  - CAD s/p CABG    Plan:  - likely 2/2 DKA and maybe chronic due to CVA  - on Heparin gtt, cardiology following  - on DKA protocol   - on fluids low rate, EF 20%  - on Vanc and Ceftriaxone   - Heparin/PPI  - DNR CCA  - Palliative care        Nehemiah Mckay M.D PGY 2  Internal Medicine Resident  3/12/2018    Attending physician: Dr. Kamari Crowder    I personally saw, examined and provided care for the patient. Radiographs, labs and medication list were reviewed by me independently. I spoke with bedside nursing, therapists and consultants. Critical care services and times documented are independent of procedures and multidisciplinary rounds with Residents. Additionally comprehensive, multidisciplinary rounds were conducted with the MICU team. The case was discussed in detail and plans for care were established. Review of Residents documentation was conducted and revisions were made as appropriate. I agree with the above documented exam, problem list and plan of care.     Need to discuss with family ,with no PCI ,pain will continue and   Prognosis is poor  Per daughter ,pt express wishes of no aggressive measures  Waiting rest of family   Emilia Arnett

## 2018-03-13 NOTE — PLAN OF CARE
Called to bedside. Pt is tachypneic bretahing in 45s. STAT ABG obtained showed sever metabolic acidosis withe secondary respiratory alkalosis. Pt was placed on BiPAP an given breathing treatment. LA trending up. Spoke to daughter at [facility] [phone],SLFLWLQ about her mother condition. She states that her mother will not want anything aggressive and no escalation of care \"just keep her comfortable\" . Code status changed to DNR CC,will start morphine drip and family are on the way.

## 2018-03-15 NOTE — DISCHARGE SUMMARY
[address] [facility]  Department of Family Medicine  Family Medicine Residency Program  Discharge Summary      Patient ID:  Doreen Mc  [id number]  74 y.o.  1945    Admit date: 3/10/2018    Discharge date and time: 3/13/2018  6:15 AM    Admission Diagnoses:   Patient Active Problem List   Diagnosis    ICD (implantable cardioverter-defibrillator), biventricular, in situ    S/P CABG (coronary artery bypass graft)    Upper sorbian speaking patient    Coronary artery disease involving native coronary artery of native heart without angina pectoris    CKD (chronic kidney disease)    Acquired hypothyroidism    Valvular heart disease    Ischemic cardiomyopathy    Type 2 diabetes mellitus with diabetic nephropathy, with long-term current use of insulin [facility]    Essential hypertension    Non morbid obesity due to excess calories    Asthma    Rectal bleeding    Chronic systolic CHF (congestive heart failure) ([facility])    Bronchitis    Parainfluenza infection    Malignant neoplasm of central portion of left female breast ([facility]    Type 2 diabetes mellitus with hyperglycemia, with long-term current use of insulin ([facility])    Acute ischemic stroke ([facility]    Hyperlipidemia    CAD (coronary artery disease)    Cerebrovascular accident (CVA) due to occlusion of right middle cerebral artery ([facility]    DKA, type 1, not at goal [facility])    Diabetic acidosis without coma ([facility]    NORMA (acute kidney injury) ([facility]    Metabolic encephalopathy    Urinary tract infection without hematuria    Palliative care by specialist    PVD (peripheral vascular disease) ([facility]       Discharge Diagnoses: dka, nstemi, HFpEF     Consults: cardiology and pulmonary/intensive care    Procedures: none     Hospital Course: Admitted with DKA, ams, nstemi. Blood sugar was 400. Was put on DKA protocol. EKG showed NSTEMI. Troponin were elevated. Cardiology was consulted. No immediate intervention was indicated.  The DKA was more concerning along with the ams. Blood sugars were more controlled and anion gap was decreasing. Family wanted her to be DNR-CC due to her past stroke and personal wishes to not be intubated or have life extending intervention. Became increasingly tachypneic with RR in the 40s. ABG showed metabolic acidosis. Was placed on BiBAP and LA trended upward. Daughter was called and reiterated again that to just \"make her comfortable\". Morphine was started and patient passed away comfortable soon after. Discharge Exam:  See progress note from today    Disposition: , to  home     Patient Instructions:   Discharge Medication List as of 3/13/2018  6:17 AM      CONTINUE these medications which have NOT CHANGED    Details   metFORMIN (GLUCOPHAGE) 1000 MG tablet Take 1,000 mg by mouth 2 times daily (with meals)Historical Med      losartan (COZAAR) 25 MG tablet Take 25 mg by mouth dailyHistorical Med      insulin glargine (LANTUS) 100 UNIT/ML injection vial Inject 10 Units into the skin nightlyHistorical Med      insulin 70-30 (HUMULIN 70/30) (70-30) 100 UNIT per ML injection vial Inject 40 Units into the skin 2 times dailyHistorical Med      docusate sodium (COLACE) 100 MG capsule Take 1 capsule by mouth 2 times daily as needed for Constipation, Disp-60 capsule, R-2Normal      polyethylene glycol (MIRALAX) powder Take 17 g by mouth daily, Disp-510 g, R-2Normal      mirtazapine (REMERON) 15 MG tablet Take 1 tablet by mouth nightly, Disp-30 tablet, R-3Normal      meclizine (ANTIVERT) 12.5 MG tablet Take 12.5 mg by mouth 3 times daily as neededHistorical Med      ondansetron (ZOFRAN-ODT) 4 MG disintegrating tablet Take 4 mg by mouth every 8 hours as needed for Nausea or VomitingHistorical Med      anastrozole (ARIMIDEX) 1 MG tablet Take 1 tablet by mouth daily, Disp-90 tablet, R-1Normal      Lancets MISC Disp-100 each, R-5, NormalTo be used to check blood glucose three times a day.       glucose blood VI test strips (ASCENSIA

## 2018-03-16 LAB
BLOOD CULTURE, ROUTINE: ABNORMAL
ORGANISM: ABNORMAL

## 2018-03-18 LAB
CULTURE, BLOOD 2: ABNORMAL
ORGANISM: ABNORMAL
ORGANISM: ABNORMAL

## 2018-03-30 LAB
EKG ATRIAL RATE: 115 BPM
EKG ATRIAL RATE: 117 BPM
EKG P AXIS: 45 DEGREES
EKG P AXIS: 91 DEGREES
EKG Q-T INTERVAL: 432 MS
EKG Q-T INTERVAL: 438 MS
EKG QRS DURATION: 156 MS
EKG QRS DURATION: 166 MS
EKG QTC CALCULATION (BAZETT): 597 MS
EKG QTC CALCULATION (BAZETT): 605 MS
EKG R AXIS: 180 DEGREES
EKG R AXIS: 80 DEGREES
EKG T AXIS: -156 DEGREES
EKG T AXIS: -30 DEGREES
EKG VENTRICULAR RATE: 115 BPM
EKG VENTRICULAR RATE: 115 BPM